# Patient Record
Sex: FEMALE | URBAN - METROPOLITAN AREA
[De-identification: names, ages, dates, MRNs, and addresses within clinical notes are randomized per-mention and may not be internally consistent; named-entity substitution may affect disease eponyms.]

---

## 2018-11-15 ENCOUNTER — LAB VISIT (OUTPATIENT)
Dept: LAB | Facility: OTHER | Age: 53
End: 2018-11-15
Payer: COMMERCIAL

## 2018-11-15 DIAGNOSIS — R93.5 ABDOMINAL ULTRASOUND, ABNORMAL: ICD-10-CM

## 2018-11-15 DIAGNOSIS — R93.5 ABDOMINAL ULTRASOUND, ABNORMAL: Primary | ICD-10-CM

## 2018-11-15 LAB
ALBUMIN SERPL BCP-MCNC: 4.4 G/DL
ALP SERPL-CCNC: 61 U/L
ALT SERPL W/O P-5'-P-CCNC: 17 U/L
ANION GAP SERPL CALC-SCNC: 7 MMOL/L
AST SERPL-CCNC: 17 U/L
BASOPHILS # BLD AUTO: 0.03 K/UL
BASOPHILS NFR BLD: 0.3 %
BILIRUB SERPL-MCNC: 0.6 MG/DL
BUN SERPL-MCNC: 14 MG/DL
CALCIUM SERPL-MCNC: 9.7 MG/DL
CHLORIDE SERPL-SCNC: 108 MMOL/L
CO2 SERPL-SCNC: 26 MMOL/L
CREAT SERPL-MCNC: 0.8 MG/DL
DIFFERENTIAL METHOD: ABNORMAL
EOSINOPHIL # BLD AUTO: 0.2 K/UL
EOSINOPHIL NFR BLD: 1.7 %
ERYTHROCYTE [DISTWIDTH] IN BLOOD BY AUTOMATED COUNT: 13.5 %
EST. GFR  (AFRICAN AMERICAN): >60 ML/MIN/1.73 M^2
EST. GFR  (NON AFRICAN AMERICAN): >60 ML/MIN/1.73 M^2
GLUCOSE SERPL-MCNC: 96 MG/DL
HCT VFR BLD AUTO: 43 %
HGB BLD-MCNC: 14.4 G/DL
LYMPHOCYTES # BLD AUTO: 2.8 K/UL
LYMPHOCYTES NFR BLD: 32 %
MCH RBC QN AUTO: 31.8 PG
MCHC RBC AUTO-ENTMCNC: 33.5 G/DL
MCV RBC AUTO: 95 FL
MONOCYTES # BLD AUTO: 0.7 K/UL
MONOCYTES NFR BLD: 8.2 %
NEUTROPHILS # BLD AUTO: 5 K/UL
NEUTROPHILS NFR BLD: 57.7 %
PLATELET # BLD AUTO: 258 K/UL
PMV BLD AUTO: 11 FL
POTASSIUM SERPL-SCNC: 4.6 MMOL/L
PROT SERPL-MCNC: 7.5 G/DL
RBC # BLD AUTO: 4.53 M/UL
SODIUM SERPL-SCNC: 141 MMOL/L
WBC # BLD AUTO: 8.65 K/UL

## 2018-11-15 PROCEDURE — 85025 COMPLETE CBC W/AUTO DIFF WBC: CPT

## 2018-11-15 PROCEDURE — 80053 COMPREHEN METABOLIC PANEL: CPT

## 2018-11-15 PROCEDURE — 36415 COLL VENOUS BLD VENIPUNCTURE: CPT

## 2019-06-03 ENCOUNTER — HOSPITAL ENCOUNTER (INPATIENT)
Facility: OTHER | Age: 54
LOS: 4 days | Discharge: HOME | DRG: 378 | End: 2019-06-08
Attending: EMERGENCY MEDICINE | Admitting: SPECIALIST
Payer: COMMERCIAL

## 2019-06-03 DIAGNOSIS — K26.9 DUODENAL ULCERATION: ICD-10-CM

## 2019-06-03 DIAGNOSIS — K31.1 GASTRIC OUTFLOW OBSTRUCTION: ICD-10-CM

## 2019-06-03 DIAGNOSIS — D72.829 LEUKOCYTOSIS, UNSPECIFIED TYPE: ICD-10-CM

## 2019-06-03 DIAGNOSIS — R11.2 INTRACTABLE VOMITING WITH NAUSEA, UNSPECIFIED VOMITING TYPE: Primary | ICD-10-CM

## 2019-06-03 DIAGNOSIS — R10.9 ABDOMINAL PAIN: ICD-10-CM

## 2019-06-03 LAB
ALBUMIN SERPL BCP-MCNC: 4.3 G/DL (ref 3.5–5.2)
ALP SERPL-CCNC: 86 U/L (ref 55–135)
ALT SERPL W/O P-5'-P-CCNC: 15 U/L (ref 10–44)
ANION GAP SERPL CALC-SCNC: 15 MMOL/L (ref 8–16)
AST SERPL-CCNC: 16 U/L (ref 10–40)
BASOPHILS # BLD AUTO: 0.02 K/UL (ref 0–0.2)
BASOPHILS NFR BLD: 0.1 % (ref 0–1.9)
BILIRUB SERPL-MCNC: 0.6 MG/DL (ref 0.1–1)
BILIRUB UR QL STRIP: ABNORMAL
BUN SERPL-MCNC: 15 MG/DL (ref 6–20)
CALCIUM SERPL-MCNC: 9.8 MG/DL (ref 8.7–10.5)
CHLORIDE SERPL-SCNC: 102 MMOL/L (ref 95–110)
CLARITY UR: CLEAR
CO2 SERPL-SCNC: 21 MMOL/L (ref 23–29)
COLOR UR: YELLOW
CREAT SERPL-MCNC: 0.7 MG/DL (ref 0.5–1.4)
D DIMER PPP IA.FEU-MCNC: 0.66 MG/L FEU
DIFFERENTIAL METHOD: ABNORMAL
EOSINOPHIL # BLD AUTO: 0.1 K/UL (ref 0–0.5)
EOSINOPHIL NFR BLD: 0.6 % (ref 0–8)
ERYTHROCYTE [DISTWIDTH] IN BLOOD BY AUTOMATED COUNT: 13 % (ref 11.5–14.5)
EST. GFR  (AFRICAN AMERICAN): >60 ML/MIN/1.73 M^2
EST. GFR  (NON AFRICAN AMERICAN): >60 ML/MIN/1.73 M^2
GLUCOSE SERPL-MCNC: 107 MG/DL (ref 70–110)
GLUCOSE UR QL STRIP: NEGATIVE
HCT VFR BLD AUTO: 39.3 % (ref 37–48.5)
HGB BLD-MCNC: 12.9 G/DL (ref 12–16)
HGB UR QL STRIP: NEGATIVE
KETONES UR QL STRIP: ABNORMAL
LEUKOCYTE ESTERASE UR QL STRIP: NEGATIVE
LIPASE SERPL-CCNC: 10 U/L (ref 4–60)
LYMPHOCYTES # BLD AUTO: 1.7 K/UL (ref 1–4.8)
LYMPHOCYTES NFR BLD: 12 % (ref 18–48)
MCH RBC QN AUTO: 30.5 PG (ref 27–31)
MCHC RBC AUTO-ENTMCNC: 32.8 G/DL (ref 32–36)
MCV RBC AUTO: 93 FL (ref 82–98)
MONOCYTES # BLD AUTO: 0.6 K/UL (ref 0.3–1)
MONOCYTES NFR BLD: 4.3 % (ref 4–15)
NEUTROPHILS # BLD AUTO: 11.7 K/UL (ref 1.8–7.7)
NEUTROPHILS NFR BLD: 82.7 % (ref 38–73)
NITRITE UR QL STRIP: NEGATIVE
PH UR STRIP: 6 [PH] (ref 5–8)
PLATELET # BLD AUTO: 340 K/UL (ref 150–350)
PMV BLD AUTO: 10.1 FL (ref 9.2–12.9)
POTASSIUM SERPL-SCNC: 4 MMOL/L (ref 3.5–5.1)
PROT SERPL-MCNC: 7.4 G/DL (ref 6–8.4)
PROT UR QL STRIP: ABNORMAL
RBC # BLD AUTO: 4.23 M/UL (ref 4–5.4)
SODIUM SERPL-SCNC: 138 MMOL/L (ref 136–145)
SP GR UR STRIP: >=1.03 (ref 1–1.03)
URN SPEC COLLECT METH UR: ABNORMAL
UROBILINOGEN UR STRIP-ACNC: NEGATIVE EU/DL
WBC # BLD AUTO: 14.16 K/UL (ref 3.9–12.7)

## 2019-06-03 PROCEDURE — 96375 TX/PRO/DX INJ NEW DRUG ADDON: CPT

## 2019-06-03 PROCEDURE — 99285 EMERGENCY DEPT VISIT HI MDM: CPT | Mod: 25

## 2019-06-03 PROCEDURE — 81003 URINALYSIS AUTO W/O SCOPE: CPT

## 2019-06-03 PROCEDURE — G0378 HOSPITAL OBSERVATION PER HR: HCPCS

## 2019-06-03 PROCEDURE — 96376 TX/PRO/DX INJ SAME DRUG ADON: CPT

## 2019-06-03 PROCEDURE — 96365 THER/PROPH/DIAG IV INF INIT: CPT

## 2019-06-03 PROCEDURE — 85025 COMPLETE CBC W/AUTO DIFF WBC: CPT

## 2019-06-03 PROCEDURE — 25000003 PHARM REV CODE 250: Performed by: EMERGENCY MEDICINE

## 2019-06-03 PROCEDURE — 85379 FIBRIN DEGRADATION QUANT: CPT

## 2019-06-03 PROCEDURE — 25500020 PHARM REV CODE 255: Performed by: EMERGENCY MEDICINE

## 2019-06-03 PROCEDURE — 80053 COMPREHEN METABOLIC PANEL: CPT

## 2019-06-03 PROCEDURE — 96366 THER/PROPH/DIAG IV INF ADDON: CPT

## 2019-06-03 PROCEDURE — 83690 ASSAY OF LIPASE: CPT

## 2019-06-03 PROCEDURE — 63600175 PHARM REV CODE 636 W HCPCS: Performed by: EMERGENCY MEDICINE

## 2019-06-03 PROCEDURE — 96361 HYDRATE IV INFUSION ADD-ON: CPT

## 2019-06-03 PROCEDURE — 63600175 PHARM REV CODE 636 W HCPCS: Performed by: RADIOLOGY

## 2019-06-03 RX ORDER — MORPHINE SULFATE 10 MG/ML
0.2 INJECTION INTRAMUSCULAR; INTRAVENOUS; SUBCUTANEOUS ONCE
Status: COMPLETED | OUTPATIENT
Start: 2019-06-03 | End: 2019-06-03

## 2019-06-03 RX ORDER — SODIUM CHLORIDE 9 MG/ML
1000 INJECTION, SOLUTION INTRAVENOUS
Status: COMPLETED | OUTPATIENT
Start: 2019-06-03 | End: 2019-06-03

## 2019-06-03 RX ORDER — MORPHINE SULFATE 10 MG/ML
0.2 INJECTION INTRAMUSCULAR; INTRAVENOUS; SUBCUTANEOUS ONCE
Status: DISCONTINUED | OUTPATIENT
Start: 2019-06-03 | End: 2019-06-03

## 2019-06-03 RX ORDER — SODIUM CHLORIDE 9 MG/ML
INJECTION, SOLUTION INTRAVENOUS CONTINUOUS
Status: DISCONTINUED | OUTPATIENT
Start: 2019-06-03 | End: 2019-06-08 | Stop reason: HOSPADM

## 2019-06-03 RX ORDER — HYDROMORPHONE HYDROCHLORIDE 1 MG/ML
1 INJECTION, SOLUTION INTRAMUSCULAR; INTRAVENOUS; SUBCUTANEOUS EVERY 4 HOURS PRN
Status: DISCONTINUED | OUTPATIENT
Start: 2019-06-03 | End: 2019-06-08 | Stop reason: HOSPADM

## 2019-06-03 RX ORDER — CITALOPRAM 10 MG/1
10 TABLET ORAL
COMMUNITY
Start: 2019-04-15

## 2019-06-03 RX ORDER — ONDANSETRON 2 MG/ML
4 INJECTION INTRAMUSCULAR; INTRAVENOUS
Status: COMPLETED | OUTPATIENT
Start: 2019-06-03 | End: 2019-06-03

## 2019-06-03 RX ORDER — ERGOCALCIFEROL 1.25 MG/1
CAPSULE ORAL
COMMUNITY
Start: 2019-04-15

## 2019-06-03 RX ORDER — CLORAZEPATE DIPOTASSIUM 3.75 MG/1
TABLET ORAL
COMMUNITY
Start: 2019-05-23

## 2019-06-03 RX ORDER — VALACYCLOVIR HYDROCHLORIDE 500 MG/1
TABLET, FILM COATED ORAL
COMMUNITY
Start: 2019-05-27

## 2019-06-03 RX ADMIN — SODIUM CHLORIDE 1000 ML: 0.9 INJECTION, SOLUTION INTRAVENOUS at 01:06

## 2019-06-03 RX ADMIN — SODIUM CHLORIDE 1000 ML: 0.9 INJECTION, SOLUTION INTRAVENOUS at 12:06

## 2019-06-03 RX ADMIN — PIPERACILLIN AND TAZOBACTAM 4.5 G: 4; .5 INJECTION, POWDER, LYOPHILIZED, FOR SOLUTION INTRAVENOUS; PARENTERAL at 01:06

## 2019-06-03 RX ADMIN — MORPHINE SULFATE 0.2 MG: 10 INJECTION INTRAMUSCULAR; INTRAVENOUS; SUBCUTANEOUS at 03:06

## 2019-06-03 RX ADMIN — ONDANSETRON 4 MG: 2 INJECTION INTRAMUSCULAR; INTRAVENOUS at 08:06

## 2019-06-03 RX ADMIN — SODIUM CHLORIDE 1000 ML: 0.9 INJECTION, SOLUTION INTRAVENOUS at 06:06

## 2019-06-03 RX ADMIN — ONDANSETRON 4 MG: 2 INJECTION INTRAMUSCULAR; INTRAVENOUS at 06:06

## 2019-06-03 RX ADMIN — SODIUM CHLORIDE: 0.9 INJECTION, SOLUTION INTRAVENOUS at 09:06

## 2019-06-03 RX ADMIN — PROMETHAZINE HYDROCHLORIDE 12.5 MG: 25 INJECTION INTRAMUSCULAR; INTRAVENOUS at 12:06

## 2019-06-03 RX ADMIN — PROMETHAZINE HYDROCHLORIDE 6.25 MG: 25 INJECTION INTRAMUSCULAR; INTRAVENOUS at 09:06

## 2019-06-03 RX ADMIN — IOHEXOL 75 ML: 350 INJECTION, SOLUTION INTRAVENOUS at 08:06

## 2019-06-03 NOTE — ED NOTES
Introduced self to pt. Pt denied needing anything at this time. NAD noted. Resp even and unlabored. Call bell within reach. Side rails up x 2. Will continue to monitor.

## 2019-06-03 NOTE — ED NOTES
Received report from DAWIT Albright. Patient is resting comfortably in stretcher, respirations are even and unlabored. Patient skin is warm and dry. Patient in no acute distress. The patient has been updated on plan of care and current status.  Comfort postioning and restroom needs were addressed. Pt personal items placed at bedside. The call bell remains at bedside, bed in lowest position and side up x 2. Will continue to monitor patient.

## 2019-06-03 NOTE — ED NOTES
Pt sleeping comfortably in bed. NAD noted. Resp even and unlabored. Side rails up x 2. Call light within reach. Will continue to monitor.

## 2019-06-03 NOTE — ED TRIAGE NOTES
"Pt presents with c/o nausea onset sp dbl mastectomy with reconstruction on 4/29/19. Pt states emesis since this weekend, reports 5 episodes in the past 24 hrs. Pt states surgeon gave her zofran and phenergan for persistent nausea, pt states no improvement with anti nausea medications. Pt states surgeon states "I dont know" in reference to her persistent pain. Pt points to right inframmanry to locate pain, describes a burning pain to her sternum, and right lower back pain. Pt denies any urinary symptoms. Pt oh fevers. Pt states LBM 3 days ago, states normally goes daily. Pt denies blood in urine or stool. Pt is well appearing, pt in NAD.   "

## 2019-06-03 NOTE — ED PROVIDER NOTES
"Encounter Date: 6/3/2019    SCRIBE #1 NOTE: I, Fer Garcia, am scribing for, and in the presence of, Dr. Acuna .       History     Chief Complaint   Patient presents with    Nausea     since double mastectomy 4/29 at [facility] and has been nauseous since, vomiting since Friday     Time seen by provider: 6:45 AM    This is a 53 y.o. female who presents with complaint of nausea that began two days after her double mastectomy with reconstruction on 4/29 at [facility]. Patient states the nausea is worse in the afternoons. She denies relief of symptom with Zofran. She also reports associated RUQ abdominal pain and back pain that began a few days s/p double mastectomy. She notes the abdominal pain is exacerbated with deep inspiration but not with eating. She has not taken OTC analgesics. Patient reports multiple episodes of vomiting that began three days ago. She denies blood in her vomit. She reports history of appendectomy as a child. Patient states an ultrasound in December 2018 "showed a spot" on her liver. Patient denies fevers, chills, headaches, dizziness, congestion, rhinorrhea, sore throat, cough, SOB, chest pain, diarrhea, dysuria, urinary frequency, or urinary urgency.         The history is provided by the patient.     Review of patient's allergies indicates:  No Known Allergies  Past Medical History:   Diagnosis Date    Cancer     breast     No past surgical history on file.  No family history on file.  Social History     Tobacco Use    Smoking status: Not on file   Substance Use Topics    Alcohol use: Not on file    Drug use: Not on file     Review of Systems   Constitutional: Negative for chills and fever.   HENT: Negative for congestion, rhinorrhea and sore throat.    Respiratory: Negative for cough and shortness of breath.    Cardiovascular: Negative for chest pain.   Gastrointestinal: Positive for abdominal pain, nausea and vomiting. Negative for diarrhea.        Negative for blood in vomit.  "   Genitourinary: Negative for dysuria, frequency and urgency.   Musculoskeletal: Positive for back pain.   Skin: Negative for rash.   Neurological: Negative for dizziness and headaches.   Psychiatric/Behavioral: Negative for confusion.       Physical Exam     Initial Vitals [06/03/19 0616]   BP Pulse Resp Temp SpO2   110/73 108 16 98.3 °F (36.8 °C) 96 %      MAP       --         Physical Exam    Nursing note and vitals reviewed.  Constitutional: She appears well-developed and well-nourished. No distress.   HENT:   Head: Normocephalic and atraumatic.   Dry mucous membranes. Oropharynx clear.    Eyes: Conjunctivae and EOM are normal.   Neck: Normal range of motion. Neck supple.   Cardiovascular: Normal rate, regular rhythm, normal heart sounds and intact distal pulses.   Pulmonary/Chest: Breath sounds normal. No respiratory distress. She has no wheezes. She has no rhonchi. She has no rales.   Surgical scars present to bilateral breasts. No evidence of infection.    Abdominal: Soft. There is tenderness.   Diffuse abdominal tenderness to palpation, worse in RUQ.    Musculoskeletal: Normal range of motion. She exhibits no edema.   Neurological: She is alert and oriented to person, place, and time. She has normal strength.   Skin: Skin is warm and dry.   Psychiatric: She has a normal mood and affect. Her behavior is normal. Judgment and thought content normal.         ED Course   Procedures  Labs Reviewed   CBC W/ AUTO DIFFERENTIAL - Abnormal; Notable for the following components:       Result Value    WBC 14.16 (*)     Gran # (ANC) 11.7 (*)     Gran% 82.7 (*)     Lymph% 12.0 (*)     All other components within normal limits   COMPREHENSIVE METABOLIC PANEL - Abnormal; Notable for the following components:    CO2 21 (*)     All other components within normal limits   URINALYSIS, REFLEX TO URINE CULTURE - Abnormal; Notable for the following components:    Specific Gravity, UA >=1.030 (*)     Protein, UA Trace (*)      Ketones, UA 3+ (*)     Bilirubin (UA) 1+ (*)     All other components within normal limits    Narrative:     Preferred Collection Type->Urine, Clean Catch   D DIMER, QUANTITATIVE - Abnormal; Notable for the following components:    D-Dimer 0.66 (*)     All other components within normal limits   LIPASE          Imaging Results          NM Hepatobiliary Scan (HIDA) (Final result)  Result time 06/03/19 16:22:27    Final result by Bonnie Leal MD (06/03/19 16:22:27)                 Impression:      The cystic and common srinivas ducts are patent.  Today's findings are not suggestive of acute cholecystitis.      Electronically signed by: Bonnie Leal MD  Date:    06/03/2019  Time:    16:22             Narrative:    EXAMINATION:  NM HEPATOBILIARY IMAGING INC GB (HIDA)    CLINICAL HISTORY:  RUQ pain, cholecystitis suspected;    TECHNIQUE:  Following the IV administration of 4.9 mCi of Tc-99m-mebrofenin, sequential dynamic anterior images of the abdomen were obtained for 60 minutes followed by a right lateral view at 60 minutes.    Subsequently, the patient received 2 mg of morphine IV, and additional images of the abdomen were acquired for 30 minutes.    A delayed image was acquired at 1.5 hours.    COMPARISON:  None    FINDINGS:  The early images demonstrate homogeneous uptake of the radiopharmaceutical by the liver with no focal hepatic abnormalities.    Normal activity is present in the common bile duct, gallbladder, and small bowel.    Following the administration of morphine, there is filling of the gallbladder.                                US Abdomen Limited (Gallbladder) (Final result)  Result time 06/03/19 09:56:19    Final result by Chin Walls MD (06/03/19 09:56:19)                 Impression:      Mild intra and extrahepatic biliary ductal dilatation, unchanged for nearly 10 years.    Irregular hepatic parenchyma echogenic lesion near the chris hepatis.  This may represent a hemangioma but was not  clearly identified on prior examinations.  Dedicated MRI of the abdomen without and with contrast should be considered on a nonemergent basis for further evaluation.    This report was flagged in Epic as abnormal.      Electronically signed by: Chin Walls MD  Date:    06/03/2019  Time:    09:56             Narrative:    EXAMINATION:  US ABDOMEN LIMITED    CLINICAL HISTORY:  .    Unspecified abdominal pain    TECHNIQUE:  Limited ultrasound of the right upper quadrant of the abdomen including pancreas, liver, gallbladder, common bile duct was performed.    COMPARISON:  CT of the chest, abdomen, and pelvis same date.  MRI/MRCP 01/05/2010.  Limited abdominal ultrasound 12/24/2009.    FINDINGS:  Liver: Normal in size, measuring 13.4 cm. Homogeneous echotexture. Irregular 2.4 x 2.1 x 2.5 cm echogenic lesion is present near the chris hepatis.    Gallbladder: No calculi, wall thickening, or pericholecystic fluid.  No sonographic Estevez's sign.    Biliary system: Common duct is prominent measuring 9 mm.  There is minimal associated intrahepatic ductal dilatation which is unchanged.    Spleen: Normal in size with a homogeneous echotexture, measuring 8.9 cm.    Pancreas: The visualized portions of pancreas appear normal.    Miscellaneous: No ascites.                               CTA Chest Abdomen Pelvis (Final result)  Result time 06/03/19 09:26:46    Final result by Bonnie Leal MD (06/03/19 09:26:46)                 Impression:      1. Today's exam is only remarkable for the stable intrahepatic and extrahepatic biliary ductal dilation.  The only potential new finding is pericholecystic fluid within otherwise normal appearance of the gallbladder.  The patient does have an elevated white blood cell count which would cause me to consider acute cholecystitis as an etiology.  2. The remainder of the exam is normal.      Electronically signed by: Bonnie Leal MD  Date:    06/03/2019  Time:    09:26              Narrative:    EXAMINATION:  CTA CHEST ABDOMEN PELVIS    CLINICAL HISTORY:  Chest-abdomen-pelvis trauma, serious/severe, blunt;right lower chest upper abdomen pain with positive d-dimer and recent surgery, ?cholecystitis;    TECHNIQUE:  Contiguous 1.25 mm axial images were obtained through the chest abdomen and pelvis following the administration of 75 cc of intravenous Omnipaque 350.  Sagittal and coronal reformats were also submitted.    COMPARISON:  MRCP dated 01/05/2010    FINDINGS:  Vasculature: A successful bolus of intravenous contrast was delivered to the pulmonary arteries.  There is no intraluminal filling defect to suggest pulmonary embolus.  The thoracic and abdominal aorta maintain normal caliber without evidence of dissection or other injury.  Evaluation of the aortic branch vessels show no abnormalities.    Lung parenchyma: No mass, nodule, pneumothorax, airspace consolidation or pleural effusion.    Airways: Patent.    Heart and pericardium: Normal.    Base of the neck: Normal    Dania/axilla/mediastinum: No lymphadenopathy or other abnormalities.    Biliary system: Intrahepatic and extrahepatic biliary ductal dilation is not significantly changed in appearance compared to the 2010 MRCP common bile duct measures approximately 1 cm.  The gallbladder is normal however, pericholecystic fluid is present.  There are no stones or other obstructive etiology identified on this exam.    Liver: Intrahepatic biliary ductal dilation otherwise no mass or other lesion.  The hepatic and portal veins are patent.    Gastrointestinal system: There is a single colonic diverticulum at the level of the cecum.  Otherwise the colon is normal.  The stomach and small intestines are normal as well.    Genitourinary system: There is a punctate hypodense focus in the lower pole of the left kidney which is too small to characterize however statistically represents a simple cyst.  The visualized portions of the ureters and the  decompressed bladder show no abnormalities.    The pancreas, adrenal glands, spleen, uterus and rectum are normal.    There is no intra-abdominal or pelvic free fluid, free air or lymphadenopathy.    Miscellaneous: The patient is status post bilateral mastectomy with deep inferior epigastric artery reconstruction.    Osseous and soft tissue structures: No suspicious lytic or blastic lesions.  Soft tissues within normal limits.                                 Medical Decision Making:   Initial Assessment:   53-year-old female with persistent nausea vomiting as well as upper abdominal pain since recent mastectomy.  Plan labs, consider ultrasound versus CT depending on lab work and treat with IV fluids, antiemetics and analgesics  Differential Diagnosis:   Differential diagnosis includes but is not limited to:  GERD, intestinal spasm, gastroenteritis, gastritis, ulcer, cholecystitis, gallstones, pancreatitis, ileus, small bowel obstruction, appendicitis, constipation, intestinal gas pain.    Clinical Tests:   Lab Tests: Ordered and Reviewed  Radiological Study: Ordered and Reviewed  ED Management:  I discussed patient with Dr. Carson who recommended admission for intractable nausea vomiting. He recommended a HIDA scan in order to evaluate for possible acalculous cholecystitis.  He will admit the patient who did improve somewhat in her ED stay however did have persistent nausea vomiting with inability to tolerate liquids.              Scribe Attestation:   Scribe #1: I performed the above scribed service and the documentation accurately describes the services I performed. I attest to the accuracy of the note.    Attending Attestation:           Physician Attestation for Scribe:  Physician Attestation Statement for Scribe #1: I, Dr. Acuna, reviewed documentation, as scribed by Fer Garcia  in my presence, and it is both accurate and complete.                 ED Course as of Jun 09 1256   Mon Jun 03, 2019   1141 Case  discussed with Dr. Carson (General Surgery). Recommendation is to admit the patient.     [CL]      ED Course User Index  [CL] Fer Lingu     Clinical Impression:     1. Intractable vomiting with nausea, unspecified vomiting type    2. Abdominal pain    3. Leukocytosis, unspecified type                    Disposition:   Disposition: Placed in Observation  Condition: Stable                        Maria Alejandra Acuna MD  06/09/19 0070

## 2019-06-04 ENCOUNTER — ANESTHESIA EVENT (OUTPATIENT)
Dept: ENDOSCOPY | Facility: OTHER | Age: 54
DRG: 378 | End: 2019-06-04
Payer: COMMERCIAL

## 2019-06-04 ENCOUNTER — ANESTHESIA (OUTPATIENT)
Dept: ENDOSCOPY | Facility: OTHER | Age: 54
DRG: 378 | End: 2019-06-04
Payer: COMMERCIAL

## 2019-06-04 PROBLEM — K26.9 DUODENAL ULCERATION: Status: ACTIVE | Noted: 2019-06-04

## 2019-06-04 PROBLEM — K31.1 GASTRIC OUTFLOW OBSTRUCTION: Status: ACTIVE | Noted: 2019-06-04

## 2019-06-04 LAB
ALBUMIN SERPL BCP-MCNC: 3.3 G/DL (ref 3.5–5.2)
ALP SERPL-CCNC: 63 U/L (ref 55–135)
ALT SERPL W/O P-5'-P-CCNC: 11 U/L (ref 10–44)
ANION GAP SERPL CALC-SCNC: 7 MMOL/L (ref 8–16)
AST SERPL-CCNC: 13 U/L (ref 10–40)
BASOPHILS # BLD AUTO: 0.02 K/UL (ref 0–0.2)
BASOPHILS NFR BLD: 0.1 % (ref 0–1.9)
BILIRUB SERPL-MCNC: 0.5 MG/DL (ref 0.1–1)
BUN SERPL-MCNC: 7 MG/DL (ref 6–20)
CALCIUM SERPL-MCNC: 8.2 MG/DL (ref 8.7–10.5)
CHLORIDE SERPL-SCNC: 107 MMOL/L (ref 95–110)
CO2 SERPL-SCNC: 22 MMOL/L (ref 23–29)
CREAT SERPL-MCNC: 0.6 MG/DL (ref 0.5–1.4)
DIFFERENTIAL METHOD: ABNORMAL
EOSINOPHIL # BLD AUTO: 0.3 K/UL (ref 0–0.5)
EOSINOPHIL NFR BLD: 1.8 % (ref 0–8)
ERYTHROCYTE [DISTWIDTH] IN BLOOD BY AUTOMATED COUNT: 12.9 % (ref 11.5–14.5)
EST. GFR  (AFRICAN AMERICAN): >60 ML/MIN/1.73 M^2
EST. GFR  (NON AFRICAN AMERICAN): >60 ML/MIN/1.73 M^2
GLUCOSE SERPL-MCNC: 82 MG/DL (ref 70–110)
HCT VFR BLD AUTO: 31.9 % (ref 37–48.5)
HGB BLD-MCNC: 10.3 G/DL (ref 12–16)
LIPASE SERPL-CCNC: 10 U/L (ref 4–60)
LYMPHOCYTES # BLD AUTO: 1.9 K/UL (ref 1–4.8)
LYMPHOCYTES NFR BLD: 13 % (ref 18–48)
MCH RBC QN AUTO: 30.3 PG (ref 27–31)
MCHC RBC AUTO-ENTMCNC: 32.3 G/DL (ref 32–36)
MCV RBC AUTO: 94 FL (ref 82–98)
MONOCYTES # BLD AUTO: 0.8 K/UL (ref 0.3–1)
MONOCYTES NFR BLD: 5.5 % (ref 4–15)
NEUTROPHILS # BLD AUTO: 11.3 K/UL (ref 1.8–7.7)
NEUTROPHILS NFR BLD: 79.4 % (ref 38–73)
PLATELET # BLD AUTO: 296 K/UL (ref 150–350)
PMV BLD AUTO: 9.8 FL (ref 9.2–12.9)
POTASSIUM SERPL-SCNC: 3.9 MMOL/L (ref 3.5–5.1)
PROT SERPL-MCNC: 5.5 G/DL (ref 6–8.4)
RBC # BLD AUTO: 3.4 M/UL (ref 4–5.4)
SODIUM SERPL-SCNC: 136 MMOL/L (ref 136–145)
WBC # BLD AUTO: 14.25 K/UL (ref 3.9–12.7)

## 2019-06-04 PROCEDURE — 94761 N-INVAS EAR/PLS OXIMETRY MLT: CPT

## 2019-06-04 PROCEDURE — 25000003 PHARM REV CODE 250: Performed by: EMERGENCY MEDICINE

## 2019-06-04 PROCEDURE — 80053 COMPREHEN METABOLIC PANEL: CPT

## 2019-06-04 PROCEDURE — 88305 TISSUE EXAM BY PATHOLOGIST: CPT | Mod: 26,59,, | Performed by: PATHOLOGY

## 2019-06-04 PROCEDURE — 25000003 PHARM REV CODE 250: Performed by: SPECIALIST

## 2019-06-04 PROCEDURE — 85025 COMPLETE CBC W/AUTO DIFF WBC: CPT

## 2019-06-04 PROCEDURE — 25500020 PHARM REV CODE 255: Performed by: SPECIALIST

## 2019-06-04 PROCEDURE — 88305 TISSUE EXAM BY PATHOLOGIST: CPT | Performed by: PATHOLOGY

## 2019-06-04 PROCEDURE — 43239 EGD BIOPSY SINGLE/MULTIPLE: CPT | Performed by: INTERNAL MEDICINE

## 2019-06-04 PROCEDURE — 88331 PATH CONSLTJ SURG 1 BLK 1SPC: CPT | Mod: 26,,, | Performed by: PATHOLOGY

## 2019-06-04 PROCEDURE — 83690 ASSAY OF LIPASE: CPT

## 2019-06-04 PROCEDURE — 25000003 PHARM REV CODE 250: Performed by: ANESTHESIOLOGY

## 2019-06-04 PROCEDURE — 63600175 PHARM REV CODE 636 W HCPCS: Performed by: INTERNAL MEDICINE

## 2019-06-04 PROCEDURE — 11000001 HC ACUTE MED/SURG PRIVATE ROOM

## 2019-06-04 PROCEDURE — 63600175 PHARM REV CODE 636 W HCPCS: Performed by: SPECIALIST

## 2019-06-04 PROCEDURE — 27201012 HC FORCEPS, HOT/COLD, DISP: Performed by: INTERNAL MEDICINE

## 2019-06-04 PROCEDURE — 88331 TISSUE SPECIMEN TO PATHOLOGY - SURGERY: ICD-10-PCS | Mod: 26,,, | Performed by: PATHOLOGY

## 2019-06-04 PROCEDURE — 36415 COLL VENOUS BLD VENIPUNCTURE: CPT

## 2019-06-04 PROCEDURE — 63600175 PHARM REV CODE 636 W HCPCS: Performed by: NURSE ANESTHETIST, CERTIFIED REGISTERED

## 2019-06-04 PROCEDURE — 88305 TISSUE SPECIMEN TO PATHOLOGY - SURGERY: ICD-10-PCS | Mod: 26,,, | Performed by: PATHOLOGY

## 2019-06-04 PROCEDURE — 37000009 HC ANESTHESIA EA ADD 15 MINS: Performed by: INTERNAL MEDICINE

## 2019-06-04 PROCEDURE — C9113 INJ PANTOPRAZOLE SODIUM, VIA: HCPCS | Performed by: INTERNAL MEDICINE

## 2019-06-04 PROCEDURE — 88305 TISSUE EXAM BY PATHOLOGIST: CPT | Mod: 26,,, | Performed by: PATHOLOGY

## 2019-06-04 PROCEDURE — 25000003 PHARM REV CODE 250: Performed by: INTERNAL MEDICINE

## 2019-06-04 PROCEDURE — 88305 TISSUE SPECIMEN TO PATHOLOGY - SURGERY: ICD-10-PCS | Mod: 26,59,, | Performed by: PATHOLOGY

## 2019-06-04 PROCEDURE — 37000008 HC ANESTHESIA 1ST 15 MINUTES: Performed by: INTERNAL MEDICINE

## 2019-06-04 PROCEDURE — 63600175 PHARM REV CODE 636 W HCPCS: Performed by: EMERGENCY MEDICINE

## 2019-06-04 RX ORDER — PANTOPRAZOLE SODIUM 40 MG/10ML
40 INJECTION, POWDER, LYOPHILIZED, FOR SOLUTION INTRAVENOUS 2 TIMES DAILY
Status: DISCONTINUED | OUTPATIENT
Start: 2019-06-04 | End: 2019-06-08 | Stop reason: HOSPADM

## 2019-06-04 RX ORDER — PROPOFOL 10 MG/ML
VIAL (ML) INTRAVENOUS
Status: DISCONTINUED | OUTPATIENT
Start: 2019-06-04 | End: 2019-06-04

## 2019-06-04 RX ORDER — DIPHENHYDRAMINE HYDROCHLORIDE 50 MG/ML
12.5 INJECTION INTRAMUSCULAR; INTRAVENOUS NIGHTLY PRN
Status: DISCONTINUED | OUTPATIENT
Start: 2019-06-04 | End: 2019-06-08 | Stop reason: HOSPADM

## 2019-06-04 RX ORDER — HYDROMORPHONE HYDROCHLORIDE 2 MG/ML
0.4 INJECTION, SOLUTION INTRAMUSCULAR; INTRAVENOUS; SUBCUTANEOUS EVERY 5 MIN PRN
Status: DISCONTINUED | OUTPATIENT
Start: 2019-06-04 | End: 2019-06-04

## 2019-06-04 RX ORDER — ONDANSETRON 2 MG/ML
4 INJECTION INTRAMUSCULAR; INTRAVENOUS DAILY PRN
Status: DISCONTINUED | OUTPATIENT
Start: 2019-06-04 | End: 2019-06-04

## 2019-06-04 RX ORDER — OXYCODONE HYDROCHLORIDE 5 MG/1
5 TABLET ORAL
Status: DISCONTINUED | OUTPATIENT
Start: 2019-06-04 | End: 2019-06-04

## 2019-06-04 RX ORDER — DEXAMETHASONE SODIUM PHOSPHATE 4 MG/ML
INJECTION, SOLUTION INTRA-ARTICULAR; INTRALESIONAL; INTRAMUSCULAR; INTRAVENOUS; SOFT TISSUE
Status: DISCONTINUED | OUTPATIENT
Start: 2019-06-04 | End: 2019-06-04

## 2019-06-04 RX ORDER — SODIUM CHLORIDE 0.9 % (FLUSH) 0.9 %
3 SYRINGE (ML) INJECTION
Status: DISCONTINUED | OUTPATIENT
Start: 2019-06-04 | End: 2019-06-08 | Stop reason: HOSPADM

## 2019-06-04 RX ORDER — MEPERIDINE HYDROCHLORIDE 25 MG/ML
12.5 INJECTION INTRAMUSCULAR; INTRAVENOUS; SUBCUTANEOUS ONCE AS NEEDED
Status: DISCONTINUED | OUTPATIENT
Start: 2019-06-04 | End: 2019-06-04

## 2019-06-04 RX ORDER — ONDANSETRON 2 MG/ML
INJECTION INTRAMUSCULAR; INTRAVENOUS
Status: DISCONTINUED | OUTPATIENT
Start: 2019-06-04 | End: 2019-06-04

## 2019-06-04 RX ORDER — SODIUM CHLORIDE, SODIUM LACTATE, POTASSIUM CHLORIDE, CALCIUM CHLORIDE 600; 310; 30; 20 MG/100ML; MG/100ML; MG/100ML; MG/100ML
INJECTION, SOLUTION INTRAVENOUS CONTINUOUS PRN
Status: DISCONTINUED | OUTPATIENT
Start: 2019-06-04 | End: 2019-06-04

## 2019-06-04 RX ORDER — SUCRALFATE 1 G/10ML
1 SUSPENSION ORAL EVERY 6 HOURS
Status: DISCONTINUED | OUTPATIENT
Start: 2019-06-04 | End: 2019-06-08 | Stop reason: HOSPADM

## 2019-06-04 RX ORDER — PROPOFOL 10 MG/ML
VIAL (ML) INTRAVENOUS CONTINUOUS PRN
Status: DISCONTINUED | OUTPATIENT
Start: 2019-06-04 | End: 2019-06-04

## 2019-06-04 RX ORDER — LIDOCAINE HCL/PF 100 MG/5ML
SYRINGE (ML) INTRAVENOUS
Status: DISCONTINUED | OUTPATIENT
Start: 2019-06-04 | End: 2019-06-04

## 2019-06-04 RX ADMIN — SODIUM CHLORIDE: 0.9 INJECTION, SOLUTION INTRAVENOUS at 03:06

## 2019-06-04 RX ADMIN — PROMETHAZINE HYDROCHLORIDE 6.25 MG: 25 INJECTION INTRAMUSCULAR; INTRAVENOUS at 05:06

## 2019-06-04 RX ADMIN — PROMETHAZINE HYDROCHLORIDE 6.25 MG: 25 INJECTION INTRAMUSCULAR; INTRAVENOUS at 04:06

## 2019-06-04 RX ADMIN — PROPOFOL 100 MG: 10 INJECTION, EMULSION INTRAVENOUS at 01:06

## 2019-06-04 RX ADMIN — IOHEXOL 75 ML: 350 INJECTION, SOLUTION INTRAVENOUS at 04:06

## 2019-06-04 RX ADMIN — PROPOFOL 100 MCG/KG/MIN: 10 INJECTION, EMULSION INTRAVENOUS at 01:06

## 2019-06-04 RX ADMIN — SODIUM CHLORIDE: 0.9 INJECTION, SOLUTION INTRAVENOUS at 06:06

## 2019-06-04 RX ADMIN — DEXAMETHASONE SODIUM PHOSPHATE 8 MG: 4 INJECTION, SOLUTION INTRAMUSCULAR; INTRAVENOUS at 01:06

## 2019-06-04 RX ADMIN — ONDANSETRON 4 MG: 2 INJECTION INTRAMUSCULAR; INTRAVENOUS at 01:06

## 2019-06-04 RX ADMIN — SUCRALFATE 1 G: 1 SUSPENSION ORAL at 11:06

## 2019-06-04 RX ADMIN — PANTOPRAZOLE SODIUM 40 MG: 40 INJECTION, POWDER, LYOPHILIZED, FOR SOLUTION INTRAVENOUS at 08:06

## 2019-06-04 RX ADMIN — SUCRALFATE 1 G: 1 SUSPENSION ORAL at 08:06

## 2019-06-04 RX ADMIN — LIDOCAINE HYDROCHLORIDE 50 MG: 20 INJECTION, SOLUTION INTRAVENOUS at 01:06

## 2019-06-04 RX ADMIN — PROMETHAZINE HYDROCHLORIDE 6.25 MG: 25 INJECTION INTRAMUSCULAR; INTRAVENOUS at 11:06

## 2019-06-04 RX ADMIN — PIPERACILLIN AND TAZOBACTAM 4.5 G: 4; .5 INJECTION, POWDER, LYOPHILIZED, FOR SOLUTION INTRAVENOUS; PARENTERAL at 08:06

## 2019-06-04 RX ADMIN — PROPOFOL 50 MG: 10 INJECTION, EMULSION INTRAVENOUS at 01:06

## 2019-06-04 RX ADMIN — SODIUM CHLORIDE, SODIUM LACTATE, POTASSIUM CHLORIDE, AND CALCIUM CHLORIDE: .6; .31; .03; .02 INJECTION, SOLUTION INTRAVENOUS at 01:06

## 2019-06-04 RX ADMIN — PROMETHAZINE HYDROCHLORIDE 6.25 MG: 25 INJECTION INTRAMUSCULAR; INTRAVENOUS at 10:06

## 2019-06-04 RX ADMIN — DIPHENHYDRAMINE HYDROCHLORIDE 12.5 MG: 50 INJECTION, SOLUTION INTRAMUSCULAR; INTRAVENOUS at 08:06

## 2019-06-04 NOTE — PROGRESS NOTES
HD 2.  Diagnosis-gastric outlet obstruction, intractable nausea and vomiting, abdominal pain  Status post EGD today    PE  A a low-grade temperature  Vital signs stable  HEENT-anicteric, atraumatic  Neck -supple, trachea midline  Chest -clear  Heart-regular rate and rhythm  Abdomen-soft, tenderness to deep palpation right upper quadrant, positive bowel sounds  Extremities-no tenderness, edema    Tammy     Impression       Focal thickening at the 2nd portion of the duodenum just distal to a superiorly directed duodenal diverticulum.  Finding likely correlates to ulcerative mass seen on today's EGD and may represent an inflammatory/infectious process, stricture or neoplasm.  Recommend correlation with pathology results.    Intra and extrahepatic biliary ductal dilatation, similar to multiple prior exams.    Hepatic dome 10 mm lesion present on 2010 exam, likely a hemangioma.  Too small to further characterize hypoattenuating lesion in hepatic segment 8 remains indeterminate.  For further evaluation an abdominal MRI with contrast may be obtained.    This report was flagged in Epic as abnormal.      Electronically signed by: Emy Saucedo  Date: 06/04/2019  Time: 16:56     Assessment-  Gastric outlet obstruction due to inflammatory process, stricture, possible neoplastic process in the 2nd portion of the duodenum.    Plan  Await results of EGD biopsy  Nutritional assessment and begin TPN  PPI  NPO

## 2019-06-04 NOTE — CONSULTS
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  Presents with abdominal pain and intractable nausea.  Consulted for incisions from surgery. Bilateral breast incisions and transverse abdominal incision appear well approximated.  No need for wound care intervention at this time.

## 2019-06-04 NOTE — PLAN OF CARE
Problem: Adult Inpatient Plan of Care  Goal: Plan of Care Review  Outcome: Ongoing (interventions implemented as appropriate)  Pt resting this morning with some nausea - medication administered - pt had full relief - stable - A&Ox4 - has remained free from falls - no other complaints at this time - will soon be transported off unit for endoscopy - will continue to monitor

## 2019-06-04 NOTE — [employer]ATION PATIENT INSTRUCTIONS
Discharge Summary/Instructions after an Endoscopic Procedure  Patient Name: Saritha Gallego  Patient MRN: [id number]  Patient YOB: 1965 Tuesday, June 04, 2019  Fabrizio Cantor MD  RESTRICTIONS:  During your procedure today, you received medications for sedation.  These   medications may affect your judgment, balance and coordination.  Therefore,   for 24 hours, you have the following restrictions:   - DO NOT drive a car, operate machinery, make legal/financial decisions,   sign important papers or drink alcohol.    ACTIVITY:  Today: no heavy lifting, straining or running due to procedural   sedation/anesthesia.  The following day: return to full activity including work.  DIET:  Eat and drink normally unless instructed otherwise.     TREATMENT FOR COMMON SIDE EFFECTS:  - Mild abdominal pain, nausea, belching, bloating or excessive gas:  rest,   eat lightly and use a heating pad.  - Sore Throat: treat with throat lozenges and/or gargle with warm salt   water.  - Because air was used during the procedure, expelling large amounts of air   from your rectum or belching is normal.  - If a bowel prep was taken, you may not have a bowel movement for 1-3 days.    This is normal.  SYMPTOMS TO WATCH FOR AND REPORT TO YOUR PHYSICIAN:  1. Abdominal pain or bloating, other than gas cramps.  2. Chest pain.  3. Back pain.  4. Signs of infection such as: chills or fever occurring within 24 hours   after the procedure.  5. Rectal bleeding, which would show as bright red, maroon, or black stools.   (A tablespoon of blood from the rectum is not serious, especially if   hemorrhoids are present.)  6. Vomiting.  7. Weakness or dizziness.  GO DIRECTLY TO THE NEAREST EMERGENCY ROOM IF YOU HAVE ANY OF THE FOLLOWING:      Difficulty breathing              Chills and/or fever over 101 F   Persistent vomiting and/or vomiting blood   Severe abdominal pain   Severe chest pain   Black, tarry stools   Bleeding- more than one  tablespoon   Any other symptom or condition that you feel may need urgent attention  Your doctor recommends these additional instructions:  If any biopsies were taken, your doctors clinic will contact you in 1 to 2   weeks with any results.  - Await pathology results.   - Return patient to hospital [facility] for ongoing care.   - NPO.   - Use Protonix (pantoprazole) 80 mg IV BID.   - The findings and recommendations were discussed with the surgeon.   - The findings and recommendations were discussed with the patient's   family.  For questions, problems or results please call your physician - Fabrizio Cantor MD at Work:  [phone].  [facility], EMERGENCY ROOM PHONE NUMBER: [phone], [facility]   [phone].  IF A COMPLICATION OR EMERGENCY SITUATION ARISES AND YOU ARE UNABLE TO REACH   YOUR PHYSICIAN - GO DIRECTLY TO THE EMERGENCY ROOM.  MD Fabrizio Ulrich MD  6/4/2019 2:21:45 PM  This report has been verified and signed electronically.  [employer]

## 2019-06-04 NOTE — PLAN OF CARE
Problem: Adult Inpatient Plan of Care  Goal: Plan of Care Review  Outcome: Ongoing (interventions implemented as appropriate)     06/04/19 0641   Plan of Care Review   Plan of Care Reviewed With patient   Progress improving   Pt rested peacefully throughout the shift. POC reviewed. Pt maintained clear liquid diet. Pt ambulates to and from restroom without assistance. Nausea maintained with PRN meds. Pt didn't vomit all shift. Pt remained free of falls or injuries this shift will continue to monitor.

## 2019-06-04 NOTE — ANESTHESIA POSTPROCEDURE EVALUATION
Anesthesia Post Evaluation    Patient: Saritha Gallego    Procedure(s) Performed: Procedure(s) (LRB):  EGD (ESOPHAGOGASTRODUODENOSCOPY) (N/A)    Final Anesthesia Type: general  Patient location during evaluation: PACU  Patient participation: Yes- Able to Participate  Level of consciousness: awake and alert  Post-procedure vital signs: reviewed and stable  Pain management: adequate  Airway patency: patent  PONV status at discharge: No PONV  Anesthetic complications: no      Cardiovascular status: blood pressure returned to baseline  Respiratory status: unassisted  Hydration status: euvolemic  Follow-up not needed.          Vitals Value Taken Time   /64 6/4/2019  3:20 PM   Temp 36.7 °C (98 °F) 6/4/2019  3:20 PM   Pulse 64 6/4/2019  3:20 PM   Resp 18 6/4/2019  3:20 PM   SpO2 99 % 6/4/2019  3:20 PM         Event Time     Out of Recovery 06/04/2019 14:50:00          Pain/Rm Score: Pain Rating Prior to Med Admin: 7 (6/3/2019  3:50 PM)

## 2019-06-04 NOTE — NURSING
PT ARRIVED TO UNIT. PT IS STABLE, VITAL SIGNS TAKEN-SEE FLOWSHEET FOR DETAILS.  PT ORIENTED TO ROOM, HOSPITAL POLICIES, AND DIETARY REGIMEN. PT EDUCATED ON FALL RISK, BED ALARMS, SMOKING CESSATION, AND ENCOURAGED TO CALL WHEN NEEDING RN OR PCT FOR PAIN MANAGEMENT, TOILETING, AND OTHER REQUESTS. MD AWARE OF PATIENTS ARRIVAL TO UNIT AND BED. ALL ADMIT DOCUMENTATION WILL BE RECORDED AND PT WILL BE MONITORED BY RN AND ANCILLARY STAFF.

## 2019-06-04 NOTE — NURSING
Pt complaining about not being able to tolerate NG tube any longer. Dr. Carson notified and at the bedside. NG tube removed per Dr. Carson. Will continue to monitor.

## 2019-06-04 NOTE — OR NURSING
Pt resting with eyes closed, awakens spontaneously. AAOx3, no c/o pain or nausea and VSS, ready for transfer to inpatient room. Report called to Dewayne and , Manish, updated and sent to room 308.

## 2019-06-04 NOTE — TRANSFER OF CARE
Anesthesia Transfer of Care Note    Patient: Saritha Gallego    Procedure(s) Performed: Procedure(s) (LRB):  EGD (ESOPHAGOGASTRODUODENOSCOPY) (N/A)    Patient location: PACU    Anesthesia Type: general    Transport from OR: Transported from OR on 2-3 L/min O2 by NC with adequate spontaneous ventilation    Post pain: adequate analgesia    Post assessment: no apparent anesthetic complications    Post vital signs: stable    Level of consciousness: awake    Nausea/Vomiting: no nausea/vomiting    Complications: none    Transfer of care protocol was followed      Last vitals:   Visit Vitals  BP (!) 141/79 (BP Location: Left arm, Patient Position: Lying)   Pulse 74   Temp 37 °C (98.6 °F) (Oral)   Resp 16   Ht 5' (1.524 m)   Wt 51 kg (112 lb 7 oz)   SpO2 100%   Breastfeeding? No   BMI 21.96 kg/m²

## 2019-06-04 NOTE — H&P
[facility]  General Surgery  History & Physical    Patient Name: Saritha Gallego  MRN: [id number]  Admission Date: 6/3/2019  Attending Physician: Maykel Carson Jr., MD   Primary Care Provider: Primary Doctor No    Patient information was obtained from patient and ER records.     Subjective:     Chief Complaint/Reason for Admission:  Abdominal pain and intractable nausea    History of Present Illness:  Patient is a 53 y.o. female presents with a 5 week history of intractable nausea and intermittent vomiting since her bilateral mastectomy and [place] free flap [place] on [place].  Associated with the nausea is right upper quadrant pain. The patient states that she has been unable to keep down any food or liquids since Friday.  The nausea began approximately 2 days after surgery and has been associated with intermittent vomiting.  Patient had a strong family history of breast cancer and underwent the surgical procedure for DCIS.  Patient denies fever chills, diarrhea or constipation.  There is no history of hematuria, dysuria, urgency, or frequency.    No current facility-administered medications on file prior to encounter.      Current Outpatient Medications on File Prior to Encounter   Medication Sig    citalopram (CELEXA) 10 MG tablet Take 10 mg by mouth.    clorazepate (TRANXENE) 3.75 MG Tab TAKE 1 TABLET BY MOUTH EVERY DAY AS NEEDED FOR ANXIETY    ergocalciferol (ERGOCALCIFEROL) 50,000 unit Cap TAKE 1 CAPSULE BY MOUTH 1 TIME A WEEK    valACYclovir (VALTREX) 500 MG tablet TAKE 1 TABLET(500 MG) BY MOUTH DAILY       Review of patient's allergies indicates:  No Known Allergies    Past Medical History:   Diagnosis Date    Cancer     breast     Past Surgical History:   Procedure Laterality Date    APPENDECTOMY      MASTECTOMY      double mastectomy with breast flap reconstruction     Family History     Problem Relation (Age of Onset)    Cancer Mother    Heart disease  Father        Tobacco Use    Smoking status: Never Smoker    Smokeless tobacco: Never Used   Substance and Sexual Activity    Alcohol use: Not Currently    Drug use: Not Currently    Sexual activity: Yes     Partners: Male     Review of Systems   Constitutional: Positive for appetite change. Negative for chills, diaphoresis and fever.   Respiratory: Negative for cough, chest tightness, shortness of breath and wheezing.    Cardiovascular: Negative for chest pain, palpitations and leg swelling.   Gastrointestinal: Positive for abdominal pain, nausea and vomiting. Negative for constipation and diarrhea.   Genitourinary: Negative for difficulty urinating, dyspareunia, dysuria, frequency and hematuria.   Musculoskeletal: Negative for arthralgias, back pain, gait problem and myalgias.   Neurological: Negative for dizziness, tremors, facial asymmetry, speech difficulty and light-headedness.   Psychiatric/Behavioral: Negative for agitation, behavioral problems, confusion and hallucinations.     Objective:     Vital Signs (Most Recent):  Temp: 99.1 °F (37.3 °C) (06/03/19 2017)  Pulse: 80 (06/03/19 2017)  Resp: 12 (06/03/19 2017)  BP: 123/67 (06/03/19 2017)  SpO2: 97 % (06/03/19 2017) Vital Signs (24h Range):  Temp:  [98.3 °F (36.8 °C)-99.1 °F (37.3 °C)] 99.1 °F (37.3 °C)  Pulse:  [] 80  Resp:  [12-16] 12  SpO2:  [96 %-100 %] 97 %  BP: (110-146)/(67-81) 123/67     Weight: 51 kg (112 lb 7 oz)  Body mass index is 21.96 kg/m².    Physical Exam   Constitutional: She is oriented to person, place, and time. She appears well-developed and well-nourished. No distress.   HENT:   Head: Normocephalic and atraumatic.   Eyes: EOM are normal. No scleral icterus.   Neck: Neck supple. No tracheal deviation present.   Cardiovascular: Normal rate, regular rhythm and normal heart sounds.   Pulmonary/Chest: Effort normal and breath sounds normal. She has no wheezes. She has no rales.   Abdominal: Soft. Bowel sounds are normal. She  exhibits no distension and no mass. There is tenderness. There is no rebound and no guarding. No hernia.   Tenderness to deep palpation in the right upper quadrant,   Musculoskeletal: She exhibits no tenderness or deformity.   Neurological: She is alert and oriented to person, place, and time. She exhibits normal muscle tone. Coordination normal.   Skin: Skin is warm and dry. No rash noted.   Psychiatric: She has a normal mood and affect. Her behavior is normal. Judgment and thought content normal.       Significant Labs:  CBC:   Recent Labs   Lab 06/03/19  0645   WBC 14.16*   RBC 4.23   HGB 12.9   HCT 39.3      MCV 93   MCH 30.5   MCHC 32.8     CMP:   Recent Labs   Lab 06/03/19  0645      CALCIUM 9.8   ALBUMIN 4.3   PROT 7.4      K 4.0   CO2 21*      BUN 15   CREATININE 0.7   ALKPHOS 86   ALT 15   AST 16   BILITOT 0.6     Recent Labs   Lab 06/03/19  0835   COLORU Yellow   SPECGRAV >=1.030*   PHUR 6.0   PROTEINUA Trace*   NITRITE Negative   LEUKOCYTESUR Negative   UROBILINOGEN Negative       Significant Diagnostics:  HIDA scan and ultrasound show no evidence of acute cholecystitis  CTA negative for pulmonary embolus        Assessment/Plan:   Intractable nausea and vomiting  Dehydration  No evidence of acute surgical abdomen identifiable at this time    Plan  Hydration  Serial exam  GI medicine consultation  Active Diagnoses:    Diagnosis Date Noted POA    Intractable vomiting with nausea [R11.2] 06/03/2019 Yes      Problems Resolved During this Admission:     VTE Risk Mitigation (From admission, onward)        Ordered     IP VTE LOW RISK PATIENT  Once      06/03/19 2040     Place sequential compression device  Until discontinued      06/03/19 2040          Maykel Carson Jr, MD  General Surgery  [facility]

## 2019-06-04 NOTE — PROGRESS NOTES
EGD today.  See Provation for full report.    Findings:  -LA Grade C esophagitis with friability of the mucosa distally  -500 ml of clear fluid aspirated from stomach  -Mild gastritis  -In the sweep of the duodenum there was an obstructive ulcerative mass lesion with oozing and friability, biopsies were taken.  Lesion was firm with biopsy.  Both frozen section and standard pathology sent.  -16 Fr. NG tube placed at end of procedure  -Dr. Carson present for second half of the procedure    Plan:  -Discussed with radiology, over read performed with second radiologist of CTA from yesterday where there was thickening of the wall at the site of the sweep.  Will repeat CT today with oral water soluble contrast per the NG tube.  -Follow up on path, frozen and otherwise  -Check KUB prior to NG tube being placed to [facility]  -BID Pantoprazole  -Check H pylori stool antigen

## 2019-06-04 NOTE — CONSULTS
Gastroenterology Consult    6/4/2019  8:47 AM    Consulting Physician:  Fabrizio Cantor MD    Primary Care Provider: Primary Doctor No    Reason for consultation: Persistent nausea    HPI:  Saritha Gallego is a 53 y.o. female who presents with complaints of persistent nausea for the past 5 weeks that began after her double mastectomy with reconstruction on 4/29/2019.  Nausea is persistent, worse in the late afternoon and evenings with little to no relief with Phenergan and Zofran.  She endorses associated intermittent vomiting and food aversion.  On Friday, she states that the symptoms intensified and became associated with multiple episodes of vomiting with inability to tolerate solid foods or remain hydrated. She also endorse mid epigastric/RUQ pain with radiation to her back.  She was seen in the ED at [facility].  Work up included CT, US and HIDA.  CT suggestive of cholecystitis, also showed stable biliary ductal dilation and a liver lesion (also previously known to patient).  US and HIDA showed no cholecystitis.  No prior history of GI issues.  No known history of PUD.  Denies any use of chronic NSAIDs, OTC medications, or chronic MJ.  BM's unchanged.    Past Medical History:  Past Medical History:   Diagnosis Date    Cancer     breast       Allergies:   Review of patient's allergies indicates:  No Known Allergies    Current Medications:  Medications Prior to Admission   Medication Sig Dispense Refill Last Dose    citalopram (CELEXA) 10 MG tablet Take 10 mg by mouth.   Past Week    clorazepate (TRANXENE) 3.75 MG Tab TAKE 1 TABLET BY MOUTH EVERY DAY AS NEEDED FOR ANXIETY   Past Week    ergocalciferol (ERGOCALCIFEROL) 50,000 unit Cap TAKE 1 CAPSULE BY MOUTH 1 TIME A WEEK   Past Week    valACYclovir (VALTREX) 500 MG tablet TAKE 1 TABLET(500 MG) BY MOUTH DAILY   More than a month         Social History:  Social History     Socioeconomic History    Marital status:      Spouse name: Not on file     Number of children: Not on file    Years of education: Not on file    Highest education level: Not on file   Occupational History    Not on file   Social Needs    Financial resource strain: Not on file    Food insecurity:     Worry: Not on file     Inability: Not on file    Transportation needs:     Medical: Not on file     Non-medical: Not on file   Tobacco Use    Smoking status: Never Smoker    Smokeless tobacco: Never Used   Substance and Sexual Activity    Alcohol use: Not Currently    Drug use: Not Currently    Sexual activity: Yes     Partners: Male   Lifestyle    Physical activity:     Days per week: Not on file     Minutes per session: Not on file    Stress: Not on file   Relationships    Social connections:     Talks on phone: Not on file     Gets together: Not on file     Attends L[employer]-day service: Not on file     Active member of club or organization: Not on file     Attends meetings of clubs or organizations: Not on file     Relationship status: Not on file   Other Topics Concern    Not on file   Social History Narrative    Not on file       Surgical History:  Past Surgical History:   Procedure Laterality Date    APPENDECTOMY      MASTECTOMY      double mastectomy with breast flap reconstruction         Family History:  Family History   Problem Relation Age of Onset    Cancer Mother     Heart disease Father        Review of systems:   Constitutional: Negative for chills and fever.   HENT: Negative for congestion, rhinorrhea and sore throat.    Respiratory: Negative for cough and shortness of breath.    Cardiovascular: Negative for chest pain.   Gastrointestinal: Positive for abdominal pain, nausea and vomiting. Negative for diarrhea.        Negative for blood in vomit.    Genitourinary: Negative for dysuria, frequency and urgency.   Musculoskeletal: Positive for back pain.   Skin: Negative for rash.   Neurological: Negative for dizziness and headaches.   Psychiatric/Behavioral:  Negative for confusion.      Physical Exam:  Vitals:    06/03/19 2017 06/04/19 0051 06/04/19 0439 06/04/19 0811   BP: 123/67 117/60 127/66 116/72   BP Location: Right arm Left arm Left arm Left arm   Patient Position:  Lying Lying Lying   Pulse: 80 71 68 72   Resp: 12 16 12 15   Temp: 99.1 °F (37.3 °C)  98.5 °F (36.9 °C) 98.4 °F (36.9 °C)   TempSrc: Oral Oral Oral Oral   SpO2: 97% 97% 99% 96%   Weight: 51 kg (112 lb 7 oz)      Height:           General: Well developed, well nourished, female in no acute distress.    Eyes:  Anicteric sclera, PERRLA  ENT:  Moist mucous membranes, no drainage from ears or nose, hearing grossly intact  Lymph:  No cervical, supraclavicular or axillary lymphadenopathy  Neck:  Supple, no nodes or masses felt, no thyromegaly  Cardiovascular:  Regular rate and rhythm without murmur  Lungs:  Clear to auscultation with normal effort; no wheezes or rales noted  GI:  Soft, NTND, no masses, bowel sounds present  Musculoskeletal:  No clubbing, cyanosis, or edema  Neurologic:  No focal deficits, alert and oriented x 3  Psych:  Appropriate mood and affect  Skin:  No rash, no pallor, no lesions    Labs:  Results for RENETTA PEREZ (MRN [id number]) as of 6/4/2019 08:52   Ref. Range 6/4/2019 04:59   WBC Latest Ref Range: 3.90 - 12.70 K/uL 14.25 (H)   RBC Latest Ref Range: 4.00 - 5.40 M/uL 3.40 (L)   Hemoglobin Latest Ref Range: 12.0 - 16.0 g/dL 10.3 (L)   Hematocrit Latest Ref Range: 37.0 - 48.5 % 31.9 (L)   MCV Latest Ref Range: 82 - 98 fL 94   MCH Latest Ref Range: 27.0 - 31.0 pg 30.3   MCHC Latest Ref Range: 32.0 - 36.0 g/dL 32.3   RDW Latest Ref Range: 11.5 - 14.5 % 12.9   Platelets Latest Ref Range: 150 - 350 K/uL 296   MPV Latest Ref Range: 9.2 - 12.9 fL 9.8   Gran% Latest Ref Range: 38.0 - 73.0 % 79.4 (H)   Gran # (ANC) Latest Ref Range: 1.8 - 7.7 K/uL 11.3 (H)   Lymph% Latest Ref Range: 18.0 - 48.0 % 13.0 (L)   Lymph # Latest Ref Range: 1.0 - 4.8 K/uL 1.9   Mono% Latest Ref Range: 4.0 - 15.0 %  5.5   Mono # Latest Ref Range: 0.3 - 1.0 K/uL 0.8   Eosinophil% Latest Ref Range: 0.0 - 8.0 % 1.8   Eos # Latest Ref Range: 0.0 - 0.5 K/uL 0.3   Basophil% Latest Ref Range: 0.0 - 1.9 % 0.1   Baso # Latest Ref Range: 0.00 - 0.20 K/uL 0.02   Differential Method Unknown Automated   Sodium Latest Ref Range: 136 - 145 mmol/L 136   Potassium Latest Ref Range: 3.5 - 5.1 mmol/L 3.9   Chloride Latest Ref Range: 95 - 110 mmol/L 107   CO2 Latest Ref Range: 23 - 29 mmol/L 22 (L)   Anion Gap Latest Ref Range: 8 - 16 mmol/L 7 (L)   BUN, Bld Latest Ref Range: 6 - 20 mg/dL 7   Creatinine Latest Ref Range: 0.5 - 1.4 mg/dL 0.6   eGFR if non African American Latest Ref Range: >60 mL/min/1.73 m^2 >60   eGFR if  Latest Ref Range: >60 mL/min/1.73 m^2 >60   Glucose Latest Ref Range: 70 - 110 mg/dL 82   Calcium Latest Ref Range: 8.7 - 10.5 mg/dL 8.2 (L)   Alkaline Phosphatase Latest Ref Range: 55 - 135 U/L 63   PROTEIN TOTAL Latest Ref Range: 6.0 - 8.4 g/dL 5.5 (L)   Albumin Latest Ref Range: 3.5 - 5.2 g/dL 3.3 (L)   BILIRUBIN TOTAL Latest Ref Range: 0.1 - 1.0 mg/dL 0.5   AST Latest Ref Range: 10 - 40 U/L 13   ALT Latest Ref Range: 10 - 44 U/L 11   Lipase Latest Ref Range: 4 - 60 U/L 10       Imaging and Other Studies:  [facility]PAT[facility] GB (HIDA)   Impression       The cystic and common srinivas ducts are patent.  Today's findings are not suggestive of acute cholecystitis.   US ABDOMEN LIMITED   Impression       Mild intra and extrahepatic biliary ductal dilatation, unchanged for nearly 10 years.    Irregular hepatic parenchyma echogenic lesion near the chris hepatis.  This may represent a hemangioma but was not clearly identified on prior examinations.  Dedicated MRI of the abdomen without and with contrast should be considered on a nonemergent basis for further evaluation.    This report was flagged in Epic as abnormal.   CTA Chest Abdomen Pelvis   Impression       1. Today's exam is only remarkable for the  stable intrahepatic and extrahepatic biliary ductal dilation.  The only potential new finding is pericholecystic fluid within otherwise normal appearance of the gallbladder.  The patient does have an elevated white blood cell count which would cause me to consider acute cholecystitis as an etiology.  2. The remainder of the exam is normal       Assessment:  1.  Intractable nausea/vomiting  2.  RUQ pain with radiation to back  3.  Liver lesion - known to patient, incompletely characterized on US/CT  4.  Stable biliary ductal dilation    Plan:  1.  PPI  2.  Anti emetics as needed  3.  EGD today  4.  MRI of the abdomen with Eovist to further evaluate liver lesion    Discussed with Dr. Carson.  More recs to follow EGD.    Fabrizio Cantor

## 2019-06-04 NOTE — ANESTHESIA PREPROCEDURE EVALUATION
06/04/2019  Saritha Gallego is a 53 y.o., female.    Anesthesia Evaluation    I have reviewed the Patient Summary Reports.    I have reviewed the Nursing Notes.   I have reviewed the Medications.     Review of Systems  Anesthesia Hx:  No problems with previous Anesthesia  Denies Family Hx of Anesthesia complications.   Denies Personal Hx of Anesthesia complications.   Social:  Non-Smoker    Hematology/Oncology:  Hematology Normal      Current/Recent Cancer. Breast   EENT/Dental:EENT/Dental Normal   Cardiovascular:  Cardiovascular Normal Exercise tolerance: good     Pulmonary:  Pulmonary Normal    Renal/:  Renal/ Normal     Musculoskeletal:  Musculoskeletal Normal    Neurological:  Neurology Normal    Endocrine:  Endocrine Normal    Dermatological:  Skin Normal    Psych:  Psychiatric Normal           Physical Exam  General:  Well nourished    Airway/Jaw/Neck:  Airway Findings: Mouth Opening: Normal Tongue: Normal  General Airway Assessment: Adult  Mallampati: I  TM Distance: Normal, at least 6 cm  Jaw/Neck Findings:  Neck ROM: Normal ROM      Dental:  Dental Findings: In tact             Anesthesia Plan  Type of Anesthesia, risks & benefits discussed:  Anesthesia Type:  general  Patient's Preference:   Intra-op Monitoring Plan:   Intra-op Monitoring Plan Comments:   Post Op Pain Control Plan:   Post Op Pain Control Plan Comments:   Induction:   IV  Beta Blocker:         Informed Consent: Patient understands risks and agrees with Anesthesia plan.  Questions answered. Anesthesia consent signed with patient.  ASA Score: 2  emergent   Day of Surgery Review of History & Physical:    H&P update referred to the surgeon.     Anesthesia Plan Notes: Patient had bilat mastectomies within the last week or so. Now with intractable vomiting (none today).        Ready For Surgery From Anesthesia Perspective.

## 2019-06-04 NOTE — PLAN OF CARE
SW met with pt at bedside to complete discharge assessment, PCP is Juanita White and uses [facility] on [address].  Pt llives with spouse and daughter, 17.  Spouse will provide transportation home.  No needs identified at this time.     06/04/19 1016   Discharge Assessment   Assessment Type Discharge Planning Assessment   Confirmed/corrected address and phone number on facesheet? Yes   Assessment information obtained from? Patient   Communicated expected length of stay with patient/caregiver no   Prior to hospitilization cognitive status: Alert/Oriented   Prior to hospitalization functional status: Independent   Current cognitive status: Alert/Oriented   Current Functional Status: Independent   Lives With spouse;child(emily), dependent   Able to Return to Prior Arrangements yes   Is patient able to care for self after discharge? Yes   Readmission Within the Last 30 Days no previous admission in last 30 days   Patient currently being followed by outpatient case management? No   Patient currently receives any other outside agency services? No   Equipment Currently Used at Home none   Do you have any problems affording any of your prescribed medications? No   Is the patient taking medications as prescribed? yes   Does the patient have transportation home? Yes   Transportation Anticipated family or friend will provide   Does the patient receive services at the [facility]? No   Discharge Plan A Home   DME Needed Upon Discharge  none   Patient/Family in Agreement with Plan yes

## 2019-06-05 PROBLEM — E44.0 MODERATE MALNUTRITION: Status: ACTIVE | Noted: 2019-06-05

## 2019-06-05 LAB
CANCER AG19-9 SERPL-ACNC: 5 U/ML (ref 2–40)
CEA SERPL-MCNC: <1 NG/ML (ref 0–5)

## 2019-06-05 PROCEDURE — 76937 US GUIDE VASCULAR ACCESS: CPT

## 2019-06-05 PROCEDURE — 25000003 PHARM REV CODE 250: Performed by: INTERNAL MEDICINE

## 2019-06-05 PROCEDURE — 11000001 HC ACUTE MED/SURG PRIVATE ROOM

## 2019-06-05 PROCEDURE — C1751 CATH, INF, PER/CENT/MIDLINE: HCPCS

## 2019-06-05 PROCEDURE — 94761 N-INVAS EAR/PLS OXIMETRY MLT: CPT

## 2019-06-05 PROCEDURE — 97802 MEDICAL NUTRITION INDIV IN: CPT

## 2019-06-05 PROCEDURE — C9113 INJ PANTOPRAZOLE SODIUM, VIA: HCPCS | Performed by: INTERNAL MEDICINE

## 2019-06-05 PROCEDURE — 63600175 PHARM REV CODE 636 W HCPCS: Performed by: INTERNAL MEDICINE

## 2019-06-05 PROCEDURE — 63600175 PHARM REV CODE 636 W HCPCS: Performed by: SPECIALIST

## 2019-06-05 PROCEDURE — 86301 IMMUNOASSAY TUMOR CA 19-9: CPT

## 2019-06-05 PROCEDURE — 36569 INSJ PICC 5 YR+ W/O IMAGING: CPT

## 2019-06-05 PROCEDURE — 36415 COLL VENOUS BLD VENIPUNCTURE: CPT

## 2019-06-05 PROCEDURE — 82378 CARCINOEMBRYONIC ANTIGEN: CPT

## 2019-06-05 PROCEDURE — 25000003 PHARM REV CODE 250: Performed by: SPECIALIST

## 2019-06-05 RX ADMIN — PIPERACILLIN AND TAZOBACTAM 4.5 G: 4; .5 INJECTION, POWDER, LYOPHILIZED, FOR SOLUTION INTRAVENOUS; PARENTERAL at 09:06

## 2019-06-05 RX ADMIN — SUCRALFATE 1 G: 1 SUSPENSION ORAL at 12:06

## 2019-06-05 RX ADMIN — SUCRALFATE 1 G: 1 SUSPENSION ORAL at 05:06

## 2019-06-05 RX ADMIN — SODIUM CHLORIDE: 0.9 INJECTION, SOLUTION INTRAVENOUS at 05:06

## 2019-06-05 RX ADMIN — DIPHENHYDRAMINE HYDROCHLORIDE 12.5 MG: 50 INJECTION, SOLUTION INTRAMUSCULAR; INTRAVENOUS at 09:06

## 2019-06-05 RX ADMIN — PANTOPRAZOLE SODIUM 40 MG: 40 INJECTION, POWDER, LYOPHILIZED, FOR SOLUTION INTRAVENOUS at 09:06

## 2019-06-05 RX ADMIN — LEUCINE, PHENYLALANINE, LYSINE, METHIONINE, ISOLEUCINE, VALINE, HISTIDINE, THREONINE, TRYPTOPHAN, ALANINE, GLYCINE, ARGININE, PROLINE, SERINE, TYROSINE, SODIUM ACETATE, DIBASIC POTASSIUM PHOSPHATE, MAGNESIUM CHLORIDE, SODIUM CHLORIDE, CALCIUM CHLORIDE, DEXTROSE 1000 ML
365; 280; 290; 200; 300; 290; 240; 210; 90; 1035; 515; 575; 340; 250; 20; 340; 261; 51; 59; 33; 15 INJECTION INTRAVENOUS at 05:06

## 2019-06-05 RX ADMIN — PIPERACILLIN AND TAZOBACTAM 4.5 G: 4; .5 INJECTION, POWDER, LYOPHILIZED, FOR SOLUTION INTRAVENOUS; PARENTERAL at 12:06

## 2019-06-05 RX ADMIN — PIPERACILLIN AND TAZOBACTAM 4.5 G: 4; .5 INJECTION, POWDER, LYOPHILIZED, FOR SOLUTION INTRAVENOUS; PARENTERAL at 04:06

## 2019-06-05 RX ADMIN — PROMETHAZINE HYDROCHLORIDE 6.25 MG: 25 INJECTION INTRAMUSCULAR; INTRAVENOUS at 05:06

## 2019-06-05 NOTE — PROGRESS NOTES
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

## 2019-06-05 NOTE — PLAN OF CARE
Problem: Adult Inpatient Plan of Care  Goal: Plan of Care Review  Outcome: Ongoing (interventions implemented as appropriate)  Pt remained free of falls and injuries throughout shift. IV flushed and infusing. Managed nausea and vomiting. NG tube removed per Dr. Carson. Ambulates without assistance. VSS on room air. Bed low and locked, call light within reach, side rails up X2. Will continue to monitor.

## 2019-06-05 NOTE — PLAN OF CARE
Problem: Adult Inpatient Plan of Care  Goal: Plan of Care Review  Recommendation/Intervention:   For TPN, recommend Clinimix-E 5/15 @ 55 ml/hr with 250ml 20% lipids. Provides 1437 kcal (100% EEN) and 66g protein (108% EPN). GIR 2.7. While on parenteral nutrition, recommend to monitor daily BMP, Mg, Phos, and weekly CMP, triglycerides, prealbumin, and CRP.  Goals:  1. Meet >85% EEN and EPN within 48hr.  2.Prevent unintentional wt loss of >2% per week.   3. Nutrition related labs wnl.

## 2019-06-05 NOTE — PROGRESS NOTES
Subjective:       Patient ID: Saritha Gallego is a 53 y.o. female.    Chief Complaint:  Nausea (since double mastectomy 4/29 at [facility] and has been nauseous since, vomiting since Friday)       History of Present Illness  Pt states feeling better today  Denies any n/v    Review of Systems  Cardiovascular: negative      Objective:     Vitals:    06/04/19 1909 06/04/19 2031 06/04/19 2324 06/05/19 0505   BP: (!) 147/65  (!) 104/59 (!) 110/59   BP Location: Left arm  Left arm Left arm   Patient Position: Lying  Lying Lying   Pulse: 67  69 70   Resp: 16  18 18   Temp: 98.3 °F (36.8 °C)  97.9 °F (36.6 °C) 98.3 °F (36.8 °C)   TempSrc: Oral  Oral Oral   SpO2: 98% 98% 97% 98%   Weight:       Height:          Abdomen: soft mild distension    Data Review   Recent Results (from the past 336 hour(s))   CBC auto differential    Collection Time: 06/04/19  4:59 AM   Result Value Ref Range    WBC 14.25 (H) 3.90 - 12.70 K/uL    Hemoglobin 10.3 (L) 12.0 - 16.0 g/dL    Hematocrit 31.9 (L) 37.0 - 48.5 %    Platelets 296 150 - 350 K/uL   CBC W/ AUTO DIFFERENTIAL    Collection Time: 06/03/19  6:45 AM   Result Value Ref Range    WBC 14.16 (H) 3.90 - 12.70 K/uL    Hemoglobin 12.9 12.0 - 16.0 g/dL    Hematocrit 39.3 37.0 - 48.5 %    Platelets 340 150 - 350 K/uL      No results for input(s): APTT, INR, PTT in the last 168 hours.  Recent Labs   Lab 06/03/19  0645 06/04/19  0459    136   K 4.0 3.9    107   CO2 21* 22*   BUN 15 7   CREATININE 0.7 0.6   CALCIUM 9.8 8.2*   PROT 7.4 5.5*   BILITOT 0.6 0.5   ALKPHOS 86 63   ALT 15 11   AST 16 13    No results found for: HAV, HEPAIGM, HEPBIGM, HEPBCAB, HBEAG, HEPCAB No results found for: AFP   No results found for: CEA   No results for input(s): APTT, INR, PTT in the last 168 hours.     Assessment:     1. Intractable vomiting with nausea, unspecified vomiting type    2. Abdominal pain    3. Leukocytosis, unspecified type        Plan:     1) await biopsy results  2) PPI  3) ? Repeat EGD  Friday.

## 2019-06-05 NOTE — PROGRESS NOTES
[facility]  Adult Nutrition  Consult Note    SUMMARY     Recommendations  Recommendation/Intervention:   For TPN, recommend Clinimix-E 5/15 @ 55 ml/hr with 250ml 20% lipids. Provides 1437 kcal (100% EEN) and 66g protein (108% EPN). GIR 2.7. While on parenteral nutrition, recommend to monitor daily BMP, Mg, Phos, and weekly CMP, triglycerides, prealbumin, and CRP.  Goals:  1. Meet >85% EEN and EPN within 48hr.  2.Prevent unintentional wt loss of >2% per week.   3. Nutrition related labs wnl.   Nutrition Goal Status: new  Communication of RD Recs: discussed on rounds    Comments: Pt is currently NPO and RD consulted for TPN recs for pt with gastric outlet obstruction. Pt reported that before her mastectomy 4/29/19, UBW was 117 and that since the surgery she has had nausea and early satiety (4% wt loss in 5 weeks). Pt reported that within the past month she was eating less than normal and in smaller meals because she was getting full quicker, so instead of eating po boy all at once she'd eat it in three sittings. Since last Friday has been experiencing no appetite and nausea since admission, but mentioned that she is feeling a little better and is thinking about food today for the first time in a while. NFPE completed 6/5: moderate malnutrition in the setting of acute illness - mild temporal wasting, mild wasting at the clavicles, and mild tricep depletion.    Reason for Assessment  Reason For Assessment: consult  Diagnosis: gastrointestinal disease(gastric outlet obstruction)  Relevant Medical History: breast cancer  Interdisciplinary Rounds: attended  Nutrition Discharge Planning: pending medical course    Nutrition Risk Screen  Nutrition Risk Screen: other (see comments)(NPO, n/v)    Nutrition/Diet History  Spiritual, Cultural Beliefs, Sabianism Practices, Values that Affect Care: no  Factors Affecting Nutritional Intake: nausea/vomiting, NPO    Anthropometrics  Temp: 98 °F (36.7 °C)  Height  Method: Stated  Height: 5' (152.4 cm)  Height (inches): 60 in  Weight Method: Bed Scale  Weight: 51 kg (112 lb 7 oz)  Weight (lb): 112.44 lb  Ideal Body Weight (IBW), Female: 100 lb  % Ideal Body Weight, Female (lb): 112.44 lb  BMI (Calculated): 22  BMI Grade: 18.5-24.9 - normal  Usual Body Weight (UBW), k.2 kg  % Usual Body Weight: 96.06  % Weight Change From Usual Weight: -4.14 %     Lab/Procedures/Meds  Pertinent Labs Reviewed: reviewed  Pertinent Labs Comments: H/H: 10.3/31.9 (L), Alb 3.3 (L), corrected Ca wnl  Pertinent Medications Reviewed: reviewed  Pertinent Medications Comments: pantoprazole, sucralfate  Scheduled Meds:   iohexol  15 mL Oral Once    pantoprazole  40 mg Intravenous BID    piperacillin-tazobactam (ZOSYN) IVPB  4.5 g Intravenous Q8H    sucralfate  1 g Oral Q6H       Estimated/Assessed Needs  Weight Used For Calorie Calculations: 51 kg (112 lb 7 oz)  Energy Calorie Requirements (kcal): 3031-3814  Energy Need Method: Kcal/kg(25-30kcal/kg)  Protein Requirements: 51-61g/day(1-1.2g/kg)  Weight Used For Protein Calculations: 51 kg (112 lb 7 oz)  Fluid Requirements (mL): 1275  Estimated Fluid Requirement Method: RDA Method  RDA Method (mL): 1275     Nutrition Prescription Ordered  Current Diet Order: NPO    Evaluation of Received Nutrient/Fluid Intake   % Intake of Estimated Energy Needs: 0 - 25 %  % Meal Intake: NPO    Nutrition Risk  Level of Risk/Frequency of Follow-up: high     Assessment and Plan  Moderate malnutrition  Malnutrition in the context of Acute Illness/Injury    Related to (etiology):  Altered GI function  Signs and Symptoms (as evidenced by):  Energy intake <75% of estimated energy needs for >1 month, mild muscle and fat depletion (NFPE completed ), accompanied by wt loss of 4% x 5 weeks   Interventions:  Collaboration with Providers   Nutrition Diagnosis Status:  New    Monitor and Evaluation  Food and Nutrient Intake: parenteral nutrition intake  Food and Nutrient  Adminstration: enteral and parenteral nutrition administration  Anthropometric Measurements: weight, weight change, body mass index  Biochemical Data, Medical Tests and Procedures: electrolyte and renal panel, gastrointestinal profile, glucose/endocrine profile, inflammatory profile, lipid profile  Nutrition-Focused Physical Findings: overall appearance     Malnutrition Assessment  Malnutrition Type: acute illness or injury(s/p double mastectomy)  Energy Intake: moderate energy intake(<75% estimated energy needs for > 7 days)  Energy Intake (Malnutrition): less than 75% for greater than or equal to 1 month   Upper Arm Region (Subcutaneous Fat Loss): mild depletion   [place] Region (Muscle Loss): mild depletion  Clavicle Bone Region (Muscle Loss): mild depletion      Nutrition Follow-Up  RD Follow-up?: Yes

## 2019-06-05 NOTE — PLAN OF CARE
Problem: Adult Inpatient Plan of Care  Goal: Plan of Care Review  Outcome: Ongoing (interventions implemented as appropriate)  Bed in low and locked position and able to reposition and amb per self.  Purposeful rounding performed and remains free of injury during shift.

## 2019-06-05 NOTE — PROCEDURES
Saritha Gallego is a 53 y.o. female patient.    Temp: 98.1 °F (36.7 °C) (06/05/19 0730)  Pulse: 64 (06/05/19 0730)  Resp: 18 (06/05/19 0730)  BP: (!) 111/57 (06/05/19 0730)  SpO2: 99 % (06/05/19 0730)  Weight: 51 kg (112 lb 7 oz) (06/03/19 2017)  Height: 5' (152.4 cm) (06/03/19 0616)    PICC  Date/Time: 6/5/2019 10:20 AM  Location procedure was performed: [facility] PICC LINE PLACEMENT  Performed by: Melody Berman RN  Consent Done: Yes  Time out: Immediately prior to procedure a time out was called to verify the correct patient, procedure, equipment, support staff and site/side marked as required  Indications: med administration and vascular access  Anesthesia: local infiltration  Local anesthetic: lidocaine 1% without epinephrine  Anesthetic Total (mL): 1  Preparation: skin prepped with ChloraPrep  Skin prep agent dried: skin prep agent completely dried prior to procedure  Sterile barriers: all five maximum sterile barriers used - cap, mask, sterile gown, sterile gloves, and large sterile sheet  Hand hygiene: hand hygiene performed prior to central venous catheter insertion  Location details: left basilic  Catheter type: double lumen  Catheter size: 5 Fr  Catheter Length: 43cm    Ultrasound guidance: yes  Vessel Caliber: medium and patent, compressibility normal  Vascular Doppler: not done  Needle advanced into vessel with real time Ultrasound guidance.  Guidewire confirmed in vessel.  Image recorded and saved.  Sterile sheath used.  no esophageal manometryNumber of attempts: 1  Post-procedure: blood return through all ports, chlorhexidine patch and sterile dressing applied  Technical procedures used: microintroducer with ultrasound  Estimated blood loss (mL): 0  Specimens: No  Implants: No  Assessment: placement verified by x-ray, no pneumothorax on x-ray, tip termination and successful placement  Tip Termination Explanation: svc  Complications: none          Melody Berman  6/5/2019

## 2019-06-05 NOTE — ASSESSMENT & PLAN NOTE
Malnutrition in the context of Acute Illness/Injury    Related to (etiology):  Altered GI function  Signs and Symptoms (as evidenced by):  Energy intake <75% of estimated energy needs for >1 month, mild muscle and fat depletion (NFPE completed 6/4), accompanied by wt loss of 4% x 5 weeks   Interventions:  Collaboration with Providers   Nutrition Diagnosis Status:  Continues

## 2019-06-06 PROCEDURE — 94761 N-INVAS EAR/PLS OXIMETRY MLT: CPT

## 2019-06-06 PROCEDURE — 63600175 PHARM REV CODE 636 W HCPCS: Performed by: SPECIALIST

## 2019-06-06 PROCEDURE — C9113 INJ PANTOPRAZOLE SODIUM, VIA: HCPCS | Performed by: INTERNAL MEDICINE

## 2019-06-06 PROCEDURE — 63600175 PHARM REV CODE 636 W HCPCS: Performed by: INTERNAL MEDICINE

## 2019-06-06 PROCEDURE — 11000001 HC ACUTE MED/SURG PRIVATE ROOM

## 2019-06-06 PROCEDURE — 25000003 PHARM REV CODE 250: Performed by: SPECIALIST

## 2019-06-06 RX ADMIN — SODIUM CHLORIDE: 0.9 INJECTION, SOLUTION INTRAVENOUS at 12:06

## 2019-06-06 RX ADMIN — SUCRALFATE 1 G: 1 SUSPENSION ORAL at 05:06

## 2019-06-06 RX ADMIN — SUCRALFATE 1 G: 1 SUSPENSION ORAL at 10:06

## 2019-06-06 RX ADMIN — DIPHENHYDRAMINE HYDROCHLORIDE 12.5 MG: 50 INJECTION, SOLUTION INTRAMUSCULAR; INTRAVENOUS at 10:06

## 2019-06-06 RX ADMIN — LEUCINE, PHENYLALANINE, LYSINE, METHIONINE, ISOLEUCINE, VALINE, HISTIDINE, THREONINE, TRYPTOPHAN, ALANINE, GLYCINE, ARGININE, PROLINE, SERINE, TYROSINE, SODIUM ACETATE, DIBASIC POTASSIUM PHOSPHATE, MAGNESIUM CHLORIDE, SODIUM CHLORIDE, CALCIUM CHLORIDE, DEXTROSE
365; 280; 290; 200; 300; 290; 240; 210; 90; 1035; 515; 575; 340; 250; 20; 340; 261; 51; 59; 33; 15 INJECTION INTRAVENOUS at 03:06

## 2019-06-06 RX ADMIN — PANTOPRAZOLE SODIUM 40 MG: 40 INJECTION, POWDER, LYOPHILIZED, FOR SOLUTION INTRAVENOUS at 09:06

## 2019-06-06 RX ADMIN — SUCRALFATE 1 G: 1 SUSPENSION ORAL at 12:06

## 2019-06-06 RX ADMIN — PIPERACILLIN AND TAZOBACTAM 4.5 G: 4; .5 INJECTION, POWDER, LYOPHILIZED, FOR SOLUTION INTRAVENOUS; PARENTERAL at 12:06

## 2019-06-06 RX ADMIN — PIPERACILLIN AND TAZOBACTAM 4.5 G: 4; .5 INJECTION, POWDER, LYOPHILIZED, FOR SOLUTION INTRAVENOUS; PARENTERAL at 09:06

## 2019-06-06 RX ADMIN — PIPERACILLIN AND TAZOBACTAM 4.5 G: 4; .5 INJECTION, POWDER, LYOPHILIZED, FOR SOLUTION INTRAVENOUS; PARENTERAL at 06:06

## 2019-06-06 NOTE — PHYSICIAN QUERY
PT Name: Saritha Galleog  MR #: [id number]    Physician Query Form - Nutrition Clarification     CDS/: Criss Hancock               Contact information: [email or web address]    This form is a permanent document in the medical record.     Query Date: 2019    By submitting this query, we are merely seeking further clarification of documentation.. Please utilize your independent clinical judgment when addressing the question(s) below.    The Medical record contains the following:   Indicators  Supporting Clinical Findings Location in Medical Record   x % of Estimated Energy Intake over a time frame from p.o., TF, or TPN Positive for appetite change      Within the past month she was eating less than normal and in smaller meals because she was getting full quicker  Energy intake <75% of estimated energy needs for >1 month   H&P      Adult Nutrition Consult Note 06/   x Weight Status over a time frame Before her mastectomy 19, UBW was 117 and that since the surgery she has had nausea and early satiety (4% wt loss in 5 weeks).     Usual Body Weight (UBW), k.2 kg  % Weight Change From Usual Weight: -4.14 %    Wt loss of 4% x 5 weeks   Adult Nutrition Consult Note 06/05   x Subcutaneous Fat and/or Muscle Loss NFPE completed : moderate malnutrition in the setting of acute illness - mild temporal wasting, mild wasting at the clavicles, and mild tricep depletion    Upper Arm Region (Subcutaneous Fat Loss): mild depletion   [place] Region (Muscle Loss): mild depletion  Clavicle Bone Region (Muscle Loss): mild depletion      Adult Nutrition Consult Note 06/05    Fluid Accumulation or Edema      Reduced  Strength     x Wt / BMI / Usual Body Weight Height: 5' (152.4 cm)  Weight Method: Bed Scale  Weight: 51 kg (112 lb 7 oz)  BMI (Calculated): 22     Adult Nutrition Consult Note 06/05    Delayed Wound Healing / Failure to Thrive     x Acute or Chronic Illness Presents with a 5 week history of  intractable nausea and intermittent vomiting since her bilateral mastectomy and VITA free flap reconstruction  Underwent the surgical procedure for DCIS  Intractable nausea and vomiting  Dehydration    Gastric outlet syndrome  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, duodenal diverticulum    LA Grade C reflux esophagitis  Gastritis  One oozing duodenal ulcer with a visible vessel     H&P              General Surgery PN 06/05        EGD Report 06/04    Medication     x Treatment RD consulted for TPN recs for pt with gastric outlet obstruction    Nutritional assessment and begin TPN Adult Nutrition Consult Note 06/05    General Surgery PN 06/04   x Other Moderate malnutrition  Malnutrition in the context of Acute Illness/Injury Adult Nutrition Consult Note 06/05       AND / ASPEN Clinical Characteristics (October 2011)  A minimum of two characteristics is recommended for diagnosing either moderate or severe malnutrition   Mild Malnutrition Moderate Malnutrition Severe Malnutrition   Energy Intake from p.o., TF or TPN. < 75% intake of estimated energy needs for less than 7 days < 75% intake of estimated energy needs for greater than 7 days < 50% intake of estimated energy needs for > 5 days   Weight Loss 1-2% in 1 month  5% in 3 months  7.5% in 6 months  10% in 1 year 1-2 % in 1 week  5% in 1 month  7.5% in 3 months  10% in 6 months  20% in 1 year > 2% in 1 week  > 5% in 1 month  > 7.5% in 3 months  > 10% in 6 months  > 20% in 1 year   Physical Findings     None *Mild subcutaneous fat and/or muscle loss  *Mild fluid accumulation  *Stage II decubitus  *Surgical wound or non-healing wound *Mod/severe subcutaneous fat and/or muscle loss  *Mod/severe fluid accumulation  *Stage III or IV decubitus  *Non-healing surgical wound     Provider, please specify diagnosis or diagnoses associated with above clinical findings.    [  x] Mild Protein-Calorie Malnutrition   [  ] Moderate Protein-Calorie Malnutrition   [  ]  Cachexia   [  ] Anorexia   [  ] Other Nutritional Diagnosis (please specify):    [  ] Other:    [  ] Clinically Undetermined       Please document in your progress notes daily for the duration of treatment until resolved and include in your discharge summary.

## 2019-06-06 NOTE — PROGRESS NOTES
Chief Complaint / Reason for Consult: Intractable n/v  ROS: feeling much better overall, no pain, benson ice chips, would like to advance diet    Patient Vitals for the past 24 hrs:   BP Temp Temp src Pulse Resp SpO2   06/06/19 1554 (!) 150/67 98.2 °F (36.8 °C) Oral 66 18 98 %   06/06/19 1151 136/60 98.2 °F (36.8 °C) Oral 67 20 98 %   06/06/19 0805 130/62 98.4 °F (36.9 °C) Oral 67 18 96 %   06/05/19 2313 128/63 97.5 °F (36.4 °C) Oral 63 18 98 %   06/05/19 2012 128/72 98.8 °F (37.1 °C) Oral 73 20 98 %   06/05/19 1934 -- -- -- -- -- 95 %       Physical Exam:  Gen - Well developed, well nourished, no apparent distress  HEENT - Anicteric  CV - S1, S2, no murmurs/rubs  Lungs - CTA-B, normal excursion  Abd - Soft, NT, ND, normal BS's, no HSM.  Ext - No c/c/e  Neuro - No asterixis    Labs:  No results for input(s): WBC, RBC, HGB, HCT, PLT, MCV, MCH, MCHC in the last 24 hours.  No results for input(s): GLUCOSE, CALCIUM, PROT, NA, K, CO2, CL, BUN, CREATININE, ALKPHOS, ALT, AST, BILITOT in the last 24 hours.    Invalid input(s):  ALBUMIN  No results for input(s): PT, INR, APTT in the last 24 hours.    Path:  Duodenum, 2nd portion (biopsy):  Duodenal mucosa with reactive changes and detached fragment of fibrinopurulent exudate  Consistent with ulcer  No dysplasia, no malignancy  Assessment:  This patient is a 53 y.o. female with intractable n/v due to   Ulcerative lesion in duodenum causing GOO. Clinically pt is improving    Recommendations:  1. Trial of clears  2. NPO after MN with EGD in a.m.

## 2019-06-07 ENCOUNTER — ANESTHESIA (OUTPATIENT)
Dept: ENDOSCOPY | Facility: OTHER | Age: 54
DRG: 378 | End: 2019-06-07
Payer: COMMERCIAL

## 2019-06-07 ENCOUNTER — ANESTHESIA EVENT (OUTPATIENT)
Dept: ENDOSCOPY | Facility: OTHER | Age: 54
DRG: 378 | End: 2019-06-07
Payer: COMMERCIAL

## 2019-06-07 PROCEDURE — 63600175 PHARM REV CODE 636 W HCPCS: Performed by: NURSE ANESTHETIST, CERTIFIED REGISTERED

## 2019-06-07 PROCEDURE — 88305 TISSUE EXAM BY PATHOLOGIST: CPT | Mod: 26,,, | Performed by: PATHOLOGY

## 2019-06-07 PROCEDURE — 27201012 HC FORCEPS, HOT/COLD, DISP: Performed by: INTERNAL MEDICINE

## 2019-06-07 PROCEDURE — 25000003 PHARM REV CODE 250: Performed by: SPECIALIST

## 2019-06-07 PROCEDURE — 43239 EGD BIOPSY SINGLE/MULTIPLE: CPT | Performed by: INTERNAL MEDICINE

## 2019-06-07 PROCEDURE — 11000001 HC ACUTE MED/SURG PRIVATE ROOM

## 2019-06-07 PROCEDURE — 99900035 HC TECH TIME PER 15 MIN (STAT)

## 2019-06-07 PROCEDURE — 37000008 HC ANESTHESIA 1ST 15 MINUTES: Performed by: INTERNAL MEDICINE

## 2019-06-07 PROCEDURE — 25000003 PHARM REV CODE 250: Performed by: INTERNAL MEDICINE

## 2019-06-07 PROCEDURE — 63600175 PHARM REV CODE 636 W HCPCS: Performed by: INTERNAL MEDICINE

## 2019-06-07 PROCEDURE — 63600175 PHARM REV CODE 636 W HCPCS: Performed by: SPECIALIST

## 2019-06-07 PROCEDURE — 88305 TISSUE EXAM BY PATHOLOGIST: CPT | Performed by: PATHOLOGY

## 2019-06-07 PROCEDURE — 37000009 HC ANESTHESIA EA ADD 15 MINS: Performed by: INTERNAL MEDICINE

## 2019-06-07 PROCEDURE — 25000003 PHARM REV CODE 250: Performed by: NURSE ANESTHETIST, CERTIFIED REGISTERED

## 2019-06-07 PROCEDURE — C9113 INJ PANTOPRAZOLE SODIUM, VIA: HCPCS | Performed by: INTERNAL MEDICINE

## 2019-06-07 PROCEDURE — 88305 TISSUE SPECIMEN TO PATHOLOGY - SURGERY: ICD-10-PCS | Mod: 26,,, | Performed by: PATHOLOGY

## 2019-06-07 PROCEDURE — 97803 MED NUTRITION INDIV SUBSEQ: CPT

## 2019-06-07 RX ORDER — LIDOCAINE HCL/PF 100 MG/5ML
SYRINGE (ML) INTRAVENOUS
Status: DISCONTINUED | OUTPATIENT
Start: 2019-06-07 | End: 2019-06-07

## 2019-06-07 RX ORDER — SODIUM CHLORIDE 0.9 % (FLUSH) 0.9 %
3 SYRINGE (ML) INJECTION
Status: DISCONTINUED | OUTPATIENT
Start: 2019-06-07 | End: 2019-06-08 | Stop reason: HOSPADM

## 2019-06-07 RX ORDER — PROPOFOL 10 MG/ML
VIAL (ML) INTRAVENOUS
Status: DISCONTINUED | OUTPATIENT
Start: 2019-06-07 | End: 2019-06-07

## 2019-06-07 RX ORDER — HYDROMORPHONE HYDROCHLORIDE 2 MG/ML
0.4 INJECTION, SOLUTION INTRAMUSCULAR; INTRAVENOUS; SUBCUTANEOUS EVERY 5 MIN PRN
Status: DISCONTINUED | OUTPATIENT
Start: 2019-06-07 | End: 2019-06-08 | Stop reason: HOSPADM

## 2019-06-07 RX ORDER — SODIUM CHLORIDE, SODIUM LACTATE, POTASSIUM CHLORIDE, CALCIUM CHLORIDE 600; 310; 30; 20 MG/100ML; MG/100ML; MG/100ML; MG/100ML
INJECTION, SOLUTION INTRAVENOUS CONTINUOUS PRN
Status: DISCONTINUED | OUTPATIENT
Start: 2019-06-07 | End: 2019-06-07

## 2019-06-07 RX ORDER — MEPERIDINE HYDROCHLORIDE 25 MG/ML
12.5 INJECTION INTRAMUSCULAR; INTRAVENOUS; SUBCUTANEOUS ONCE AS NEEDED
Status: ACTIVE | OUTPATIENT
Start: 2019-06-07 | End: 2019-06-08

## 2019-06-07 RX ORDER — ONDANSETRON 2 MG/ML
4 INJECTION INTRAMUSCULAR; INTRAVENOUS DAILY PRN
Status: DISCONTINUED | OUTPATIENT
Start: 2019-06-07 | End: 2019-06-08 | Stop reason: HOSPADM

## 2019-06-07 RX ORDER — OXYCODONE HYDROCHLORIDE 5 MG/1
5 TABLET ORAL
Status: DISCONTINUED | OUTPATIENT
Start: 2019-06-07 | End: 2019-06-08 | Stop reason: HOSPADM

## 2019-06-07 RX ADMIN — PROPOFOL 30 MG: 10 INJECTION, EMULSION INTRAVENOUS at 08:06

## 2019-06-07 RX ADMIN — LIDOCAINE HYDROCHLORIDE 50 MG: 20 INJECTION, SOLUTION INTRAVENOUS at 08:06

## 2019-06-07 RX ADMIN — SUCRALFATE 1 G: 1 SUSPENSION ORAL at 12:06

## 2019-06-07 RX ADMIN — PROPOFOL 80 MG: 10 INJECTION, EMULSION INTRAVENOUS at 08:06

## 2019-06-07 RX ADMIN — PIPERACILLIN AND TAZOBACTAM 4.5 G: 4; .5 INJECTION, POWDER, LYOPHILIZED, FOR SOLUTION INTRAVENOUS; PARENTERAL at 03:06

## 2019-06-07 RX ADMIN — PANTOPRAZOLE SODIUM 40 MG: 40 INJECTION, POWDER, LYOPHILIZED, FOR SOLUTION INTRAVENOUS at 08:06

## 2019-06-07 RX ADMIN — PIPERACILLIN AND TAZOBACTAM 4.5 G: 4; .5 INJECTION, POWDER, LYOPHILIZED, FOR SOLUTION INTRAVENOUS; PARENTERAL at 07:06

## 2019-06-07 RX ADMIN — SUCRALFATE 1 G: 1 SUSPENSION ORAL at 05:06

## 2019-06-07 RX ADMIN — PIPERACILLIN AND TAZOBACTAM 4.5 G: 4; .5 INJECTION, POWDER, LYOPHILIZED, FOR SOLUTION INTRAVENOUS; PARENTERAL at 12:06

## 2019-06-07 RX ADMIN — PANTOPRAZOLE SODIUM 40 MG: 40 INJECTION, POWDER, LYOPHILIZED, FOR SOLUTION INTRAVENOUS at 10:06

## 2019-06-07 RX ADMIN — SODIUM CHLORIDE, SODIUM LACTATE, POTASSIUM CHLORIDE, AND CALCIUM CHLORIDE: .6; .31; .03; .02 INJECTION, SOLUTION INTRAVENOUS at 08:06

## 2019-06-07 NOTE — ANESTHESIA PREPROCEDURE EVALUATION
06/07/2019  Saritha Gallego is a 53 y.o., female.    Pre-op Assessment    I have reviewed the Patient Summary Reports.     I have reviewed the Nursing Notes.   I have reviewed the Medications.     Review of Systems  Anesthesia Hx:  No problems with previous Anesthesia  Denies Family Hx of Anesthesia complications.   Denies Personal Hx of Anesthesia complications.   Social:  Non-Smoker    Hematology/Oncology:  Hematology Normal      Current/Recent Cancer. Breast right axillary node dissection no lymphedema surgery   EENT/Dental:EENT/Dental Normal   Cardiovascular:  Cardiovascular Normal Exercise tolerance: good     Pulmonary:  Pulmonary Normal    Renal/:  Renal/ Normal     Hepatic/GI:   PUD,    Musculoskeletal:  Musculoskeletal Normal    Neurological:  Neurology Normal    Endocrine:  Endocrine Normal    Dermatological:  Skin Normal    Psych:  Psychiatric Normal           Physical Exam  General:  Well nourished    Airway/Jaw/Neck:  Airway Findings: Mouth Opening: Normal Tongue: Normal  General Airway Assessment: Adult  Mallampati: I  TM Distance: Normal, at least 6 cm  Jaw/Neck Findings:  Neck ROM: Normal ROM      Dental:  Dental Findings: In tact             Anesthesia Plan  Type of Anesthesia, risks & benefits discussed:  Anesthesia Type:  general  Patient's Preference:   Intra-op Monitoring Plan: standard ASA monitors  Intra-op Monitoring Plan Comments:   Post Op Pain Control Plan: per primary service following discharge from PACU  Post Op Pain Control Plan Comments:   Induction:    Beta Blocker:         Informed Consent: Patient understands risks and agrees with Anesthesia plan.  Questions answered. Anesthesia consent signed with patient.  ASA Score: 2     Day of Surgery Review of History & Physical:    H&P update referred to the surgeon.     Anesthesia Plan Notes: Patient had bilat mastectomies  within the last week or so. April 29 at [facility].   Followup exam for previous intractable vomitting  IV propofol for endoscopy        Ready For Surgery From Anesthesia Perspective.

## 2019-06-07 NOTE — PHYSICIAN QUERY
PT Name: Saritha Gallego  MR #: [id number]     PHYSICIAN QUERY -  ACUITY OF CONDITION CLARIFICATION      CDS/: Criss Hancock               Contact information: [email or web address]  This form is a permanent document in the medical record.     Query Date: June 7, 2019    By submitting this query, we are merely seeking further clarification of documentation to reflect the severity of illness of your patient. Please utilize your independent clinical judgment when addressing the question(s) below.    The Medical record reflects the following:     Indicators   Supporting Clinical Findings Location in Medical Record   x Documentation of condition Ulcerated duodenal mass, gastric outlet obstruction      Intractable n/v due to Ulcerative lesion in duodenum causing GOO. Clinically pt is improving   General Surgery PN 06/06      GI PN 06/06    Lab Value(s)      Radiology Findings      Treatment                                 Medication     x Other Path:  Duodenum, 2nd portion (biopsy):  Duodenal mucosa with reactive changes and detached fragment of fibrinopurulent exudate  Consistent with ulcer  No dysplasia, no malignancy    One oozing duodenal ulcer with a visible vessel.    One non-bleeding duodenal ulcer with no stigmata of bleeding     GI PN 06/06              EGD Report 06/04    EGD Report 06/07     Provider, please specify the acuity/chronicity of __Ulcerated Duodenal Mass__:    [ x  ] Acute   [   ] Chronic   [   ] Acute and/on chronic   [   ]  Clinically Undetermined       Please document in your progress notes daily for the duration of treatment until resolved, and include in your discharge summary.

## 2019-06-07 NOTE — PLAN OF CARE
Problem: Adult Inpatient Plan of Care  Goal: Plan of Care Review  Outcome: Ongoing (interventions implemented as appropriate)  Pt remains npo for egd. Denied any n/v this shift. Tolerated clear liquids. Safety maintined.  nad noted. Plan of care continues.

## 2019-06-07 NOTE — ANESTHESIA POSTPROCEDURE EVALUATION
Anesthesia Post Evaluation    Patient: Saritha Gallego    Procedure(s) Performed: Procedure(s) (LRB):  EGD (ESOPHAGOGASTRODUODENOSCOPY) (Left)    Final Anesthesia Type: general  Patient location during evaluation: PACU  Patient participation: Yes- Able to Participate  Level of consciousness: oriented and awake  Post-procedure vital signs: reviewed and stable  Pain management: adequate  Airway patency: patent  PONV status at discharge: No PONV  Anesthetic complications: no      Cardiovascular status: hemodynamically stable  Respiratory status: unassisted, spontaneous ventilation and room air  Hydration status: euvolemic  Follow-up not needed.          Vitals Value Taken Time   /58 6/7/2019  9:12 AM   Temp 36.5 °C (97.7 °F) 6/7/2019  9:10 AM   Pulse 72 6/7/2019  9:24 AM   Resp 18 6/7/2019  9:10 AM   SpO2 98 % 6/7/2019  9:24 AM   Vitals shown include unvalidated device data.      No case tracking events are documented in the log.      Pain/Rm Score: No data recorded

## 2019-06-07 NOTE — PLAN OF CARE
Problem: Adult Inpatient Plan of Care  Goal: Plan of Care Review  Recommendation: Continue full liquid diet and advance diet as tolerated to regular. Wean TPN as diet is advanced.     Goals:   1. Meet >85% EEN and EPN within 48hr  2. Prevent unintentional wt loss of >2% per week  3. Nutrition related labs wnl.

## 2019-06-07 NOTE — PROGRESS NOTES
HD #3  DX-ulcerated duodenal mass, gastric outlet obstruction    Subjective  Denies nausea/abdominal pain  Requests to advance diet  No bm    PE  Anicteric  Abd--soft, no tenderness, no distention, + bs      Imp  Gastric outlet obstruction , intractable nausea resolved  EGD BX-- no malignancy      Plan  Trial of liqs  EGD in am

## 2019-06-07 NOTE — TRANSFER OF CARE
Anesthesia Transfer of Care Note    Patient: Saritha Gallego    Procedure(s) Performed: Procedure(s) (LRB):  EGD (ESOPHAGOGASTRODUODENOSCOPY) (Left)    Patient location: PACU    Anesthesia Type: general    Transport from OR: Transported from OR on 2-3 L/min O2 by NC with adequate spontaneous ventilation    Post pain: adequate analgesia    Post assessment: no apparent anesthetic complications    Post vital signs: stable    Level of consciousness: awake, alert and oriented    Nausea/Vomiting: no nausea/vomiting    Complications: none    Transfer of care protocol was followed      Last vitals:   Visit Vitals  /68 (BP Location: Right leg, Patient Position: Lying)   Pulse 75   Temp 36.8 °C (98.2 °F) (Oral)   Resp 18   Ht 5' (1.524 m)   Wt 51 kg (112 lb 7 oz)   SpO2 98%   Breastfeeding? No   BMI 21.96 kg/m²

## 2019-06-07 NOTE — [employer] PATIENT INSTRUCTIONS
Discharge Summary/Instructions after an Endoscopic Procedure  Patient Name: Saritha Gallego  Patient MRN: [id number]  Patient YOB: 1965 Friday, June 07, 2019  Fabrizio Cantor MD  RESTRICTIONS:  During your procedure today, you received medications for sedation.  These   medications may affect your judgment, balance and coordination.  Therefore,   for 24 hours, you have the following restrictions:   - DO NOT drive a car, operate machinery, make legal/financial decisions,   sign important papers or drink alcohol.    ACTIVITY:  Today: no heavy lifting, straining or running due to procedural   sedation/anesthesia.  The following day: return to full activity including work.  DIET:  Eat and drink normally unless instructed otherwise.     TREATMENT FOR COMMON SIDE EFFECTS:  - Mild abdominal pain, nausea, belching, bloating or excessive gas:  rest,   eat lightly and use a heating pad.  - Sore Throat: treat with throat lozenges and/or gargle with warm salt   water.  - Because air was used during the procedure, expelling large amounts of air   from your rectum or belching is normal.  - If a bowel prep was taken, you may not have a bowel movement for 1-3 days.    This is normal.  SYMPTOMS TO WATCH FOR AND REPORT TO YOUR PHYSICIAN:  1. Abdominal pain or bloating, other than gas cramps.  2. Chest pain.  3. Back pain.  4. Signs of infection such as: chills or fever occurring within 24 hours   after the procedure.  5. Rectal bleeding, which would show as bright red, maroon, or black stools.   (A tablespoon of blood from the rectum is not serious, especially if   hemorrhoids are present.)  6. Vomiting.  7. Weakness or dizziness.  GO DIRECTLY TO THE NEAREST EMERGENCY ROOM IF YOU HAVE ANY OF THE FOLLOWING:      Difficulty breathing              Chills and/or fever over 101 F   Persistent vomiting and/or vomiting blood   Severe abdominal pain   Severe chest pain   Black, tarry stools   Bleeding- more than one  tablespoon   Any other symptom or condition that you feel may need urgent attention  Your doctor recommends these additional instructions:  If any biopsies were taken, your doctors clinic will contact you in 1 to 2   weeks with any results.  - Return patient to hospital [facility] for possible discharge same day.   - Full liquid diet.   - Use Protonix (pantoprazole) 40 mg PO BID indefinitely, likely 8 weeks   prior to repeat EGD for ulcer healing.   - Repeat upper endoscopy in 2 months to check healing.   - Return to GI office at appointment to be scheduled.   - The findings and recommendations were discussed with the surgeon.  For questions, problems or results please call your physician - Fabrizio Cantor MD at Work:  [phone].  [facility], EMERGENCY ROOM PHONE NUMBER: [phone], [facility]   [phone].  IF A COMPLICATION OR EMERGENCY SITUATION ARISES AND YOU ARE UNABLE TO REACH   YOUR PHYSICIAN - GO DIRECTLY TO THE EMERGENCY ROOM.  MD Fabrizio Ulrich MD  6/7/2019 9:13:37 AM  This report has been verified and signed electronically.  [employer]

## 2019-06-07 NOTE — PROGRESS NOTES
[facility]  Adult Nutrition  Progress Note    SUMMARY       Recommendations    Recommendation: Continue full liquid diet and advance diet as tolerated to regular. Wean TPN as diet is advanced.    Goals:   1. Meet >85% EEN and EPN within 48hr  2. Prevent unintentional wt loss of >2% per week  3. Nutrition related labs wnl.     Nutrition Goal Status: progressing towards goal  Communication of RD Recs: discussed on rounds    Reason for Assessment    Reason For Assessment: RD follow-up  Diagnosis: gastrointestinal disease(gastric outlet obstruction)  Relevant Medical History: breast cancer  Interdisciplinary Rounds: attended  General Information Comments: Saw pt s/p EGD this morning, TPN was not running but had been running prior to this. Was also on clear liquids and tolerating. Diet just advanced to full liquids. NFPE completed : moderate malnutrition in the setting of acute illness - mild temporal wasting, mild wasting at the clavicles, and mild tricep depletion.  Nutrition Discharge Planning: pending medical course    Nutrition Risk Screen    Nutrition Risk Screen: no indicators present    Nutrition/Diet History    Spiritual, Cultural Beliefs, [employer] Practices, Values that Affect Care: no  Factors Affecting Nutritional Intake: nausea/vomiting, NPO    Anthropometrics    Temp: 97.9 °F (36.6 °C)  Height Method: Stated  Height: 5' (152.4 cm)  Height (inches): 60 in  Weight Method: Bed Scale  Weight: 51 kg (112 lb 7 oz)  Weight (lb): 112.44 lb  Ideal Body Weight (IBW), Female: 100 lb  % Ideal Body Weight, Female (lb): 112.44 lb  BMI (Calculated): 22  BMI Grade: 18.5-24.9 - normal  Usual Body Weight (UBW), k.2 kg  % Usual Body Weight: 96.06  % Weight Change From Usual Weight: -4.14 %     Lab/Procedures/Meds    Pertinent Labs: Reviewed  Lab Results   Component Value Date    CO2 22 (L) 2019    CALCIUM 8.2 (L) 2019    ALBUMIN 3.3 (L) 2019     Pertinent Meds:  Reviewed  Scheduled Meds:   iohexol  15 mL Oral Once    pantoprazole  40 mg Intravenous BID    piperacillin-tazobactam (ZOSYN) IVPB  4.5 g Intravenous Q8H    sucralfate  1 g Oral Q6H     Continuous Infusions:   sodium chloride 0.9% Stopped (06/07/19 0747)    AA 5%-D15W-electrolytes Stopped (06/07/19 0747)     Estimated/Assessed Needs    Weight Used For Calorie Calculations: 51 kg (112 lb 7 oz)  Energy Calorie Requirements (kcal): 8444-7382  Energy Need Method: Kcal/kg(25-30kcal/kg)  Protein Requirements: 51-61g/day(1-1.2g/kg)  Weight Used For Protein Calculations: 51 kg (112 lb 7 oz)  Fluid Requirements (mL): 1275  Estimated Fluid Requirement Method: RDA Method    Nutrition Prescription Ordered    Current Diet Order: full liquid  Current Nutrition Support Formula Ordered: Clinimix E 5/15  Current Nutrition Support Rate Ordered: 55 (ml)  Current Nutrition Support Frequency Ordered: ml/hr x 24h    Evaluation of Received Nutrient/Fluid Intake    Parenteral Calories (kcal): 937  Parenteral Protein (gm): 66  Parenteral Fluid (mL): 1320  % Kcal Needs: 73  % Protein Needs: 108  Comments: based on current TPN order  Tolerance: tolerating  % Intake of Estimated Energy Needs: 75 - 100 %  % Meal Intake: Other: JAYMIE - diet just advanced    Nutrition Risk    Level of Risk/Frequency of Follow-up: high     Assessment and Plan    Moderate malnutrition  Malnutrition in the context of Acute Illness/Injury    Related to (etiology):  Altered GI function  Signs and Symptoms (as evidenced by):  Energy intake <75% of estimated energy needs for >1 month, mild muscle and fat depletion (NFPE completed 6/4), accompanied by wt loss of 4% x 5 weeks   Interventions:  Collaboration with Providers   Nutrition Diagnosis Status:  Continues    Monitor and Evaluation    Food and Nutrient Intake: parenteral nutrition intake  Food and Nutrient Adminstration: enteral and parenteral nutrition administration  Anthropometric Measurements: weight,  weight change, body mass index  Biochemical Data, Medical Tests and Procedures: electrolyte and renal panel, gastrointestinal profile, glucose/endocrine profile, inflammatory profile, lipid profile  Nutrition-Focused Physical Findings: overall appearance     Malnutrition Assessment  Malnutrition Type: acute illness or injury(s/p double mastectomy)  Energy Intake: moderate energy intake(<75% estimated energy needs for > 7 days)    Energy Intake (Malnutrition): less than 75% for greater than or equal to 1 month   Upper Arm Region (Subcutaneous Fat Loss): mild depletion   [place] Region (Muscle Loss): mild depletion  Clavicle Bone Region (Muscle Loss): mild depletion     Nutrition Follow-Up    RD Follow-up?: Yes

## 2019-06-08 VITALS
SYSTOLIC BLOOD PRESSURE: 122 MMHG | TEMPERATURE: 99 F | RESPIRATION RATE: 19 BRPM | DIASTOLIC BLOOD PRESSURE: 77 MMHG | BODY MASS INDEX: 22.07 KG/M2 | WEIGHT: 112.44 LBS | HEART RATE: 75 BPM | OXYGEN SATURATION: 96 % | HEIGHT: 60 IN

## 2019-06-08 PROCEDURE — 25000003 PHARM REV CODE 250: Performed by: INTERNAL MEDICINE

## 2019-06-08 PROCEDURE — 94761 N-INVAS EAR/PLS OXIMETRY MLT: CPT

## 2019-06-08 PROCEDURE — 63600175 PHARM REV CODE 636 W HCPCS: Performed by: INTERNAL MEDICINE

## 2019-06-08 PROCEDURE — C9113 INJ PANTOPRAZOLE SODIUM, VIA: HCPCS | Performed by: INTERNAL MEDICINE

## 2019-06-08 RX ORDER — PANTOPRAZOLE SODIUM 40 MG/1
40 TABLET, DELAYED RELEASE ORAL 2 TIMES DAILY
Qty: 60 TABLET | Refills: 11 | Status: SHIPPED | OUTPATIENT
Start: 2019-06-08 | End: 2020-06-07

## 2019-06-08 RX ADMIN — PIPERACILLIN AND TAZOBACTAM 4.5 G: 4; .5 INJECTION, POWDER, LYOPHILIZED, FOR SOLUTION INTRAVENOUS; PARENTERAL at 01:06

## 2019-06-08 RX ADMIN — SUCRALFATE 1 G: 1 SUSPENSION ORAL at 01:06

## 2019-06-08 RX ADMIN — PANTOPRAZOLE SODIUM 40 MG: 40 INJECTION, POWDER, LYOPHILIZED, FOR SOLUTION INTRAVENOUS at 08:06

## 2019-06-08 RX ADMIN — SUCRALFATE 1 G: 1 SUSPENSION ORAL at 05:06

## 2019-06-08 RX ADMIN — PIPERACILLIN AND TAZOBACTAM 4.5 G: 4; .5 INJECTION, POWDER, LYOPHILIZED, FOR SOLUTION INTRAVENOUS; PARENTERAL at 05:06

## 2019-06-08 RX ADMIN — SODIUM CHLORIDE: 0.9 INJECTION, SOLUTION INTRAVENOUS at 02:06

## 2019-06-08 NOTE — NURSING
Pt wants to rest. Prefers to have vitals and scheduled Carafate in the morning. No signs of distress noted. Will continue to monitor.

## 2019-06-08 NOTE — PLAN OF CARE
Problem: Adult Inpatient Plan of Care  Goal: Plan of Care Review  Outcome: Ongoing (interventions implemented as appropriate)  Denies pain at this time. Vitals stable.     06/08/19 0542   Plan of Care Review   Plan of Care Reviewed With patient   Progress no change   Outcome Summary Verbalized undersatnding       Problem: Infection  Goal: Infection Symptom Resolution    Intervention: Prevent or Manage Infection     06/08/19 0542   Prevent or Manage Infection   Infection Management aseptic technique maintained

## 2019-06-08 NOTE — PLAN OF CARE
Problem: Adult Inpatient Plan of Care  Goal: Plan of Care Review  Outcome: Ongoing (interventions implemented as appropriate)  Patient AAOx4, sating good in RA. PICC flushing well. IV fluids maintained at ordered rate. POC explained to the patient. BUE limb alert maintained. V/S WNL. Needs attended. Free from fall and injuries. Hand-off given to DAWIT Singh at 2300.

## 2019-06-08 NOTE — PROGRESS NOTES
avss  no N/V  Abd--soft, no distention, no tenderness    Diagnostics  EGD--obstruction/ulcer resolving    Plan  Advance diet

## 2019-06-08 NOTE — DISCHARGE SUMMARY
The patient is a 53-year-old female who was admitted with intractable nausea and vomiting and right upper quadrant pain. Approximately 5 weeks prior to admission the patient underwent bilateral nipple sparing mastectomies with DIE P flaps and right sentinel node biopsy.  The surgery was performed at [facility] for a 7mm lesion at 10:00 a.m. in the right breast that was DCIS on core biopsy. Final path showed no residual DCIS and the sentinel node was negative.    Since surgery the patient had been experiencing continuous nausea and intermittent vomiting.  Over the last 4 days prior to admission the patient had continuous vomiting and was unable to hold anything down.    On physical exam patient had tenderness in the right upper quadrant and her surgical incisions appeared to be healing without incident.  Imaging studies showed no evidence of gallbladder disease or issues with her recent surgery.   GI Medicine was consulted and the patient underwent EGD.  At that time the patient was found to have an inflammatory mass in the 2nd portion of the duodenum which was obstructing the gastric outlet.  The patient was placed on intravenous proton pump inhibitors.    Heladio Gallego had rapid resolution of her nausea and vomiting.A subsequent EGD on 06/07/2019 showed marked improvement in the inflammatory process and near resolution of the inflammatory mass.  The patient was tolerating soft diet at the time of discharge.     She is being discharged to be followed by GI Medicine, Dr. Fabrizio Cline.  She has been instructed to avoid NSAIDs and aspirin containing products.  She has been placed on a low residue diet with multiple small meals daily.  New medication at the time of discharge includes pantoprazole 40 mg p.o. b.i.d..

## 2019-07-31 ENCOUNTER — HOSPITAL ENCOUNTER (OUTPATIENT)
Dept: RADIOLOGY | Facility: OTHER | Age: 54
Discharge: HOME | End: 2019-07-31
Attending: INTERNAL MEDICINE
Payer: COMMERCIAL

## 2019-07-31 DIAGNOSIS — K31.5: ICD-10-CM

## 2019-07-31 DIAGNOSIS — K26.3 ACUTE DUODENAL ULCER: ICD-10-CM

## 2019-07-31 DIAGNOSIS — K31.89 OTHER DISEASES OF STOMACH AND DUODENUM: ICD-10-CM

## 2019-07-31 PROCEDURE — 74241 FL UPPER GI W KUB: CPT | Mod: 26,,, | Performed by: RADIOLOGY

## 2019-07-31 PROCEDURE — 74241 FL UPPER GI W KUB: CPT | Mod: TC,FY

## 2019-07-31 PROCEDURE — 74241 FL UPPER GI W KUB: ICD-10-PCS | Mod: 26,,, | Performed by: RADIOLOGY

## 2019-10-16 ENCOUNTER — HOSPITAL ENCOUNTER (OUTPATIENT)
Dept: RADIOLOGY | Facility: OTHER | Age: 54
Discharge: HOME | End: 2019-10-16
Attending: INTERNAL MEDICINE
Payer: COMMERCIAL

## 2019-10-16 DIAGNOSIS — R10.13 EPIGASTRIC ABDOMINAL PAIN: ICD-10-CM

## 2019-10-16 PROCEDURE — 74241 FL UPPER GI W KUB: ICD-10-PCS | Mod: 26,,, | Performed by: RADIOLOGY

## 2019-10-16 PROCEDURE — 74241 FL UPPER GI W KUB: CPT | Mod: TC,FY

## 2019-10-16 PROCEDURE — 74241 FL UPPER GI W KUB: CPT | Mod: 26,,, | Performed by: RADIOLOGY

## 2021-01-27 ENCOUNTER — LAB VISIT (OUTPATIENT)
Dept: LAB | Facility: OTHER | Age: 56
End: 2021-01-27
Attending: INTERNAL MEDICINE
Payer: COMMERCIAL

## 2021-01-27 DIAGNOSIS — R93.89 ABNORMAL RADIOLOGICAL FINDINGS IN SKIN AND SUBCUTANEOUS TISSUE: Primary | ICD-10-CM

## 2021-01-27 LAB
ALBUMIN SERPL BCP-MCNC: 4.6 G/DL (ref 3.5–5.2)
ALP SERPL-CCNC: 60 U/L (ref 55–135)
ALT SERPL W/O P-5'-P-CCNC: 19 U/L (ref 10–44)
AST SERPL-CCNC: 21 U/L (ref 10–40)
BILIRUB DIRECT SERPL-MCNC: 0.2 MG/DL (ref 0.1–0.3)
BILIRUB SERPL-MCNC: 0.7 MG/DL (ref 0.1–1)
PROT SERPL-MCNC: 7.1 G/DL (ref 6–8.4)

## 2021-01-27 PROCEDURE — 80076 HEPATIC FUNCTION PANEL: CPT

## 2021-01-27 PROCEDURE — 36415 COLL VENOUS BLD VENIPUNCTURE: CPT

## 2021-12-04 ENCOUNTER — IMMUNIZATION (OUTPATIENT)
Dept: INTERNAL MEDICINE | Facility: CLINIC | Age: 56
End: 2021-12-04
Payer: COMMERCIAL

## 2021-12-04 DIAGNOSIS — Z23 NEED FOR VACCINATION: Primary | ICD-10-CM

## 2021-12-04 PROCEDURE — 0004A COVID-19, MRNA, LNP-S, PF, 30 MCG/0.3 ML DOSE VACCINE: CPT | Mod: CV19,PBBFAC | Performed by: INTERNAL MEDICINE
